# Patient Record
Sex: FEMALE | Race: WHITE | NOT HISPANIC OR LATINO | Employment: STUDENT | URBAN - METROPOLITAN AREA
[De-identification: names, ages, dates, MRNs, and addresses within clinical notes are randomized per-mention and may not be internally consistent; named-entity substitution may affect disease eponyms.]

---
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
## 2020-01-16 ENCOUNTER — LAB SERVICES (OUTPATIENT)
Dept: LAB | Age: 20
End: 2020-01-16

## 2020-01-16 DIAGNOSIS — Z79.899 ENCOUNTER FOR LONG-TERM (CURRENT) USE OF MEDICATIONS: ICD-10-CM

## 2020-01-16 LAB
CHOLEST SERPL-MCNC: 214 MG/DL
CHOLEST/HDLC SERPL: 3.6 {RATIO}
FASTING STATUS PATIENT QL REPORTED: 10 HRS
GLUCOSE SERPL-MCNC: 90 MG/DL (ref 65–99)
HDLC SERPL-MCNC: 60 MG/DL
LDLC SERPL-MCNC: 137 MG/DL
LENGTH OF FAST TIME PATIENT: 10 HRS
NONHDLC SERPL-MCNC: 154 MG/DL
TRIGL SERPL-MCNC: 87 MG/DL

## 2020-01-16 PROCEDURE — 82947 ASSAY GLUCOSE BLOOD QUANT: CPT | Performed by: INTERNAL MEDICINE

## 2020-01-16 PROCEDURE — 36415 COLL VENOUS BLD VENIPUNCTURE: CPT | Performed by: INTERNAL MEDICINE

## 2020-01-16 PROCEDURE — 80061 LIPID PANEL: CPT | Performed by: INTERNAL MEDICINE

## 2020-01-17 ENCOUNTER — TELEPHONE (OUTPATIENT)
Dept: BEHAVIORAL HEALTH | Age: 20
End: 2020-01-17

## 2020-01-27 RX ORDER — ARIPIPRAZOLE 10 MG/1
10 TABLET ORAL DAILY
Qty: 30 TABLET | Refills: 0 | Status: SHIPPED | OUTPATIENT
Start: 2020-01-27

## 2020-03-05 ENCOUNTER — TELEPHONE (OUTPATIENT)
Dept: BEHAVIORAL HEALTH | Age: 20
End: 2020-03-05

## 2020-03-10 ENCOUNTER — APPOINTMENT (OUTPATIENT)
Dept: BEHAVIORAL HEALTH | Age: 20
End: 2020-03-10

## 2020-05-28 RX ORDER — GABAPENTIN 300 MG/1
CAPSULE ORAL
Qty: 90 CAPSULE | OUTPATIENT
Start: 2020-05-28

## 2020-05-28 RX ORDER — LAMOTRIGINE 150 MG/1
TABLET ORAL
Qty: 180 TABLET | Refills: 1 | OUTPATIENT
Start: 2020-05-28

## 2020-05-28 RX ORDER — BUPROPION HYDROCHLORIDE 300 MG/1
300 TABLET ORAL DAILY
Qty: 90 TABLET | Refills: 1 | OUTPATIENT
Start: 2020-05-28

## 2021-09-28 ENCOUNTER — HOSPITAL ENCOUNTER (OUTPATIENT)
Dept: RESEARCH | Facility: CLINIC | Age: 21
End: 2021-09-28
Attending: INTERNAL MEDICINE

## 2021-09-28 PROCEDURE — 510N000009 HC RESEARCH FACILITY, PER 15 MIN

## 2021-09-28 PROCEDURE — 300N000003 HC RESEARCH SPECIMEN PROCESSING, SIMPLE

## 2021-09-28 PROCEDURE — 510N000017 HC CRU PATIENT CARE, PER 15 MIN

## 2021-09-28 NOTE — ADDENDUM NOTE
Encounter addended by: Zulema Gaming on: 9/28/2021 1:54 PM   Actions taken: Charge Capture section accepted

## 2022-04-26 ENCOUNTER — HOSPITAL ENCOUNTER (OUTPATIENT)
Dept: RESEARCH | Facility: CLINIC | Age: 22
Discharge: HOME | End: 2022-04-26
Attending: INTERNAL MEDICINE

## 2022-04-26 PROCEDURE — 300N000003 HC RESEARCH SPECIMEN PROCESSING, SIMPLE

## 2022-04-26 PROCEDURE — 510N000017 HC CRU PATIENT CARE, PER 15 MIN

## 2022-04-26 PROCEDURE — 510N000009 HC RESEARCH FACILITY, PER 15 MIN

## 2022-04-26 NOTE — ADDENDUM NOTE
Encounter addended by: Zulema Gaming on: 4/26/2022 3:28 PM   Actions taken: Charge Capture section accepted

## 2023-04-26 ENCOUNTER — TELEPHONE (OUTPATIENT)
Dept: PLASTIC SURGERY | Facility: CLINIC | Age: 23
End: 2023-04-26

## 2023-04-26 NOTE — CONFIDENTIAL NOTE
Writer called re: pt request to schedule a top surgery consult with Dr. Henry. Writer gave next available dates in November. Pt stated they need to check with their school schedule and will get back. Writer gave direct phone number to call back.

## 2023-04-26 NOTE — TELEPHONE ENCOUNTER
[facility] Call Center    Phone Message    May a detailed message be left on voicemail: yes     Reason for Call: Other: Anny is calling in looking to schedule an appt. Pt states that they would like to schedule with Dr. Henry to discuss chest masculinization surgery. Please call back to discuss.     Action Taken: Message routed to:  [facility] Gender Care    Travel Screening: Not Applicable

## 2023-05-18 ENCOUNTER — TELEPHONE (OUTPATIENT)
Dept: PLASTIC SURGERY | Facility: CLINIC | Age: 23
End: 2023-05-18
Payer: COMMERCIAL

## 2023-05-18 DIAGNOSIS — F64.0 GENDER DYSPHORIA IN ADULT: Primary | ICD-10-CM

## 2023-05-18 NOTE — CONFIDENTIAL NOTE
[facility] :  Care Coordination Note     SITUATION   Anny Dodd (they/them) is a 22 year old adult who is receiving support for:  Care Team  .    BACKGROUND     Pt is scheduled for a top surgery consult with Dr. Henry on 1/9/23.     ASSESSMENT     Surgery              CGC Assessment  [facility] ([facility]) Enrollment: Enrolled  Patient has a therapist: Yes  Name of therapist: Brittany Hart  Letter of support #1: Requested  Surgery being considered: Yes  Mastectomy: Yes    Pt reports:   No nicotine or other gender affirming surgeries      PLAN          Nursing Interventions:       Follow-up plan:  1. Obtain LOS nearer to consult.        Latricia Livingston

## 2023-06-04 ENCOUNTER — HEALTH MAINTENANCE LETTER (OUTPATIENT)
Age: 23
End: 2023-06-04

## 2023-08-24 NOTE — TELEPHONE ENCOUNTER
FUTURE VISIT INFORMATION      FUTURE VISIT INFORMATION:  Date: 10/3/23  Time: 11:00am  [place] [facility]  REFERRAL INFORMATION:  Reason for visit/diagnosis  top consult    RECORDS REQUESTED FROM:       No recs to collect

## 2023-09-05 ENCOUNTER — TELEPHONE (OUTPATIENT)
Dept: PLASTIC SURGERY | Facility: CLINIC | Age: 23
End: 2023-09-05
Payer: COMMERCIAL

## 2023-09-29 ENCOUNTER — TRANSFERRED RECORDS (OUTPATIENT)
Dept: HEALTH INFORMATION MANAGEMENT | Facility: CLINIC | Age: 23
End: 2023-09-29

## 2023-10-03 ENCOUNTER — PRE VISIT (OUTPATIENT)
Dept: PLASTIC SURGERY | Facility: CLINIC | Age: 23
End: 2023-10-03

## 2023-10-03 ENCOUNTER — OFFICE VISIT (OUTPATIENT)
Dept: PLASTIC SURGERY | Facility: CLINIC | Age: 23
End: 2023-10-03
Attending: SURGERY
Payer: COMMERCIAL

## 2023-10-03 VITALS
BODY MASS INDEX: 42.85 KG/M2 | SYSTOLIC BLOOD PRESSURE: 137 MMHG | WEIGHT: 273 LBS | HEIGHT: 67 IN | DIASTOLIC BLOOD PRESSURE: 75 MMHG | OXYGEN SATURATION: 99 % | HEART RATE: 94 BPM

## 2023-10-03 DIAGNOSIS — F64.0 GENDER DYSPHORIA IN ADULT: ICD-10-CM

## 2023-10-03 PROCEDURE — 99203 OFFICE O/P NEW LOW 30 MIN: CPT | Performed by: SURGERY

## 2023-10-03 ASSESSMENT — PAIN SCALES - GENERAL: PAINLEVEL: NO PAIN (0)

## 2023-10-03 NOTE — LETTER
"10/3/2023       RE: Xavier Dodd  [address] [place] [address]     Dear Colleague,    Thank you for referring your patient, Xavier Dodd, to the [facility] PLASTIC AND RECONSTRUCTIVE SURGERY CLINIC [place] [facility]. Please see a copy of my visit note below.    PLASTICS NEW TOP   HPI: This is a 23 year old adult who identifies as nonbinary (he/they) with a history of gender dysphoria and PE who presents today with their partner Gwen (he/they) for a consultation for top surgery. Their pronouns are they/them and their preferred name is Sugey. They were referred by their insurance company, [employer] (who only covered 3 surgeons) and made their appointment 5 months ago (moved up from January after re-scheduling starting in July. They talked with Angelique about what was needed to be cleared for surgery). Their therapist is Ector Selby from [facility] who has written them a letter of support. This will be uploaded to [employer] [facility] after today's visit. Sugey has been seeing Ector for a few months. They are not currently on testosterone because \"the vast majority of my dysphoria is from my chest.\" They will reconsider after surgery. They have been living their chosen gender identity/role for 2 years. They bind with GC2B for size inclusion reasons, they wear this 1-3 days per week. No breast lumps, skin puckering, nipple drainage, or other breast problems. No history of breast imaging, including mammogram or breast ultrasound.     Medical Hx: Gender dysphoria. No history of asthma, diabetes mellitus, or GERD. No bleeding, clotting, healing or scarring problems. History of pulmonary embolism during covid infection. Did anticoagulation therapy for a year following this. Had Covid Pneumonia prior to this. Wasn't intubated but used a Bipap mask. Also history of Migraines. Morbid " "obesity.    Mental Hx: Bipolar II since age 13, GID, ?PTSD. Mostly stable on medications, gets worse in the winter but nothing unmanageable. Questions ADHD or ASD.      Surgical Hx: pneumothorax/collapsed lung with pneumonia in childhood; surgically corrected age 2. No known complications with anesthesia. Still has 3rd molars.     Family Hx: No family history of breast or ovarian cancer. Maternal great grandfather had diabetes. Dad was adopted. Paternal grandfather has alzheimer's. Has 1 brother who is healthy. Mom is healthy.    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 Gwen was diagnosed with Multiple Sclerosis after Covid. Majoring in Psychology, sociology minor but focuses specifically on eating disorders. Final year at [employer].  Relationship status/family: Lives with partner in [place], [place].  Gwen will help take care of them after surgery. Smoking status: No nicotine, occasional weed. Alcohol use: 1 drink every 2 weeks maybe.  Diet: Omnivore. \"I just eat enough to keep myself alive.\" Caffeine: Drinks a monster energy drink every day. Exercise: has a lot of issues exercising due to dysphoria. Does walk/hike. Would like to get back into working out, previously played rugby. Sleep:  Averages about 7 hours a night. No medications.     PE: General: Height: 5' 7\" Weight: 273 lbs 0 oz BMI: 42.76 kg/m   Chest:   Nice upper chest contour.   Grade 3 nipple ptosis.   Slight central discoloration (hyperpigmentation)  Right breast (600g) is slightly larger than the left (500g).   IMFs situated about 2-3 cms below the pec muscle.   Right IMF situated about 1-2 cm lower than the left.  Continued discoloration under IMFs.   Incisions may touch at midline.  moderate lateral thoracic rolls.   present anterior axillary folds.   Striae bilaterally.  No lymphadenopathy or masses.   Very dense, not lumpy.  Photos taken with consent.     A&P: 23 year old " adult who identifies as  Nonbinary (they/them) who is a good candidate for gender affirming top surgery with one of the following: bilateral simple mastectomy vs. breast reduction, +/- possible nipple graft reconstruction. They will most likely need a bilateral simple mastectomy with nipple graft reconstruction depending on intraoperative findings and the patient's desired outcome. The patient is NOT interested in nipple grafts. The patient will NOT need a pre-op mammogram in addition to an H&P from their PCP.      They did not meet with our Transgender Coordinator but they will be in contact via [facility] to discuss communications with staff and timeline. They did receive an introductory folder of information and contact numbers/names. Any further discussion of risks and complications will be reviewed during the pre-op visit.    Patient accepts the risks of this procedure and would like to proceed with surgery. They are able to give informed consent for this medically necessary procedure. Once we receive and review their therapist letter of support we will initiate the prior authorization process.     Encouraged patient to contact our CGC manager, Pablo Mitchell, to write a letter about not having a wait list for cases that need to be done sooner than current availability.    According to [place] Case Law and [facility] standards of care, with an appropriate letter of support from a mental health provider, top surgery/mastectomy is medically necessary for the treatment of gender dysphoria.     Total time = 30 minutes, spent on the date of encounter doing chart review, history and physical, dressing changes, documentation, patient education, and any further activity as noted above.     This note was prepared on behalf of Aparna Henry MD by Lauren Palencia (tameka/renee), a trained medical scribe, based on my observations and the provider's statements to me.              Again, thank you for allowing me to participate  in the care of your patient.      Sincerely,    Aparna Henry MD

## 2023-10-03 NOTE — NURSING NOTE
"Chief Complaint   Patient presents with    Consult     Sugey, is being seen today for a consult regarding top surgery.       Vitals:    10/03/23 1128   BP: 137/75   BP Location: Left arm   Patient Position: Chair   Cuff Size: Adult Large   Pulse: 94   SpO2: 99%   Weight: 123.8 kg (273 lb)   Height: 1.702 m (5' 7\")       Body mass index is 42.76 kg/m .      Oly Ceballos LPN    "

## 2023-10-03 NOTE — PROGRESS NOTES
"PLASTICS NEW TOP   HPI: This is a 23 year old adult who identifies as nonbinary (he/they) with a history of gender dysphoria and PE who presents today with their partner Gwen (he/they) for a consultation for top surgery. Their pronouns are they/them and their preferred name is Sugey. They were referred by their insurance company, [employer] (who only covered 3 surgeons) and made their appointment 5 months ago (moved up from January after re-scheduling starting in July. They talked with Angelique about what was needed to be cleared for surgery). Their therapist is Ector Selby from [facility] who has written them a letter of support. This will be uploaded to [employer] after today's visit. Sugey has been seeing Ector for a few months. They are not currently on testosterone because \"the vast majority of my dysphoria is from my chest.\" They will reconsider after surgery. They have been living their chosen gender identity/role for 2 years. They bind with GC2B for size inclusion reasons, they wear this 1-3 days per week. No breast lumps, skin puckering, nipple drainage, or other breast problems. No history of breast imaging, including mammogram or breast ultrasound.     Medical Hx: Gender dysphoria. No history of asthma, diabetes mellitus, or GERD. No bleeding, clotting, healing or scarring problems. History of pulmonary embolism during covid infection. Did anticoagulation therapy for a year following this. Had Covid Pneumonia prior to this. Wasn't intubated but used a Bipap mask. Also history of Migraines. Morbid obesity.    Mental Hx: Bipolar II since age 13, GID, ?PTSD. Mostly stable on medications, gets worse in the winter but nothing unmanageable. Questions ADHD or ASD.      Surgical Hx: pneumothorax/collapsed lung with pneumonia in childhood; surgically corrected age 2. No known complications with anesthesia. Still has 3rd molars.     Family Hx: No family history of " "breast or ovarian cancer. Maternal great grandfather had diabetes. Dad was adopted. Paternal grandfather has alzheimer's. Has 1 brother who is healthy. Mom is healthy.    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 Gwen was diagnosed with Multiple Sclerosis after Covid. Majoring in Psychology, sociology minor but focuses specifically on eating disorders. Final year at [employer].  Relationship status/family: Lives with partner in [place], [place].  Gwen will help take care of them after surgery. Smoking status: No nicotine, occasional weed. Alcohol use: 1 drink every 2 weeks maybe.  Diet: Omnivore. \"I just eat enough to keep myself alive.\" Caffeine: Drinks a monster energy drink every day. Exercise: has a lot of issues exercising due to dysphoria. Does walk/hike. Would like to get back into working out, previously played rugby. Sleep:  Averages about 7 hours a night. No medications.     PE: General: Height: 5' 7\" Weight: 273 lbs 0 oz BMI: 42.76 kg/m   Chest:   Nice upper chest contour.   Grade 3 nipple ptosis.   Slight central discoloration (hyperpigmentation)  Right breast (600g) is slightly larger than the left (500g).   IMFs situated about 2-3 cms below the pec muscle.   Right IMF situated about 1-2 cm lower than the left.  Continued discoloration under IMFs.   Incisions may touch at midline.  moderate lateral thoracic rolls.   present anterior axillary folds.   Striae bilaterally.  No lymphadenopathy or masses.   Very dense, not lumpy.  Photos taken with consent.     A&P: 23 year old adult who identifies as  Nonbinary (they/them) who is a good candidate for gender affirming top surgery with one of the following: bilateral simple mastectomy vs. breast reduction, +/- possible nipple graft reconstruction. They will most likely need a bilateral simple mastectomy with nipple graft reconstruction depending on intraoperative findings and the " patient's desired outcome. The patient is NOT interested in nipple grafts. The patient will NOT need a pre-op mammogram in addition to an H&P from their PCP.      They did not meet with our Transgender Coordinator but they will be in contact via [employer] to discuss communications with staff and timeline. They did receive an introductory folder of information and contact numbers/names. Any further discussion of risks and complications will be reviewed during the pre-op visit.    Patient accepts the risks of this procedure and would like to proceed with surgery. They are able to give informed consent for this medically necessary procedure. Once we receive and review their therapist letter of support we will initiate the prior authorization process.     Encouraged patient to contact our [facility] manager, Pablo Mitchell, to write a letter about not having a wait list for cases that need to be done sooner than current availability.    According to [place] Case Law and [facility] standards of care, with an appropriate letter of support from a mental health provider, top surgery/mastectomy is medically necessary for the treatment of gender dysphoria.     Total time = 30 minutes, spent on the date of encounter doing chart review, history and physical, dressing changes, documentation, patient education, and any further activity as noted above.     This note was prepared on behalf of Aparna Henry MD by Lauren Palencia (they/them), a trained medical scribe, based on my observations and the provider's statements to me.

## 2023-10-12 ENCOUNTER — DOCUMENTATION ONLY (OUTPATIENT)
Dept: PLASTIC SURGERY | Facility: CLINIC | Age: 23
End: 2023-10-12
Payer: COMMERCIAL

## 2023-10-12 NOTE — PROGRESS NOTES
[facility] :  Care Coordination Note     SITUATION   Sugey Dodd (they/them) is a 23 year old adult who is receiving support for:  Care Team  .    BACKGROUND     LOS received. LOS will need updates. Sent email to pt provider and pt will needed updates.    10/31/23  Updated Los meets criteria. Faxed to HIM. Updated Pt list. Sent message to RNCC.    ASSESSMENT     Surgery              CGC Assessment  [facility] Enrollment: Enrolled  Patient has a therapist: Yes  Name of therapist: Ector Selby  Letter of support #1: Received  Letter #1 Date: 09/29/23  Surgery being considered: Yes  Mastectomy: Yes      PLAN          Nursing Interventions:       Follow-up plan:  RNCC and surgeon will place RIP Dillard

## 2023-11-15 ENCOUNTER — DOCUMENTATION ONLY (OUTPATIENT)
Dept: PLASTIC SURGERY | Facility: CLINIC | Age: 23
End: 2023-11-15
Payer: COMMERCIAL

## 2023-11-15 ENCOUNTER — TELEPHONE (OUTPATIENT)
Dept: PLASTIC SURGERY | Facility: CLINIC | Age: 23
End: 2023-11-15
Payer: COMMERCIAL

## 2023-11-15 NOTE — TELEPHONE ENCOUNTER
RN Care Coordinator: Sandie Wray    Surgery is scheduled with Dr. Henry  Date: 3/27/2024   Location: [facility]      H&P to be completed by a clinic in [place], [place] - Patient does not have a PCP. Patient was not sure exactly what clinic they will be going to.      Surgical consult: 3/5/2024 at 8:30am with Gayle Duran [facility]    Post-op visit(s):   4/5/2024 at 1:30pm with EMILY Whitaker CNP [facility]  4/30/2024 at 10:00am with Dr. Henry [facility]      Patient will receive a phone call from pre-admission nurses 1-2 days prior to surgery with arrival time and NPO instructions. Approximate arrival time/surgery time given to patient: 1:00pm surgery and 11:00am arrival time. Patient aware times are subject to change up until day before surgery.         Spoke with the patient and was able to confirm all scheduled information.       Patient questions/concerns: N/A       Surgery packet to be sent via [employer]    __    Torie Akhtar, Senior Perioperative Coordinator, on 11/15/2023 at 9:06 AM  P: [phone]

## 2023-11-15 NOTE — PROGRESS NOTES
[facility] :  Care Coordination Note     SITUATION   Sugey is a 23 year old adult who is receiving support for: gender dysphoria.    BACKGROUND     Pt has seen Dr Henry for gender affirming top surgery consultation.  Pt has surgery scheduled for 3/27/24.    ASSESSMENT     Pt LOS in chart dated 9/23/23 and reviewed by our .  Pt does not need a pre-op mammogram. Pt does not use nicotine.      PLAN     Follow-up plan:    Will contact pt to inform of need for pre-op H&P within 30 days of surgery.  Pt to have pre-op surgical consultation with Dr Henry 3/12/24.     JEFFY Hooper

## 2024-03-06 ENCOUNTER — VIRTUAL VISIT (OUTPATIENT)
Dept: PLASTIC SURGERY | Facility: CLINIC | Age: 24
End: 2024-03-06
Payer: COMMERCIAL

## 2024-03-06 DIAGNOSIS — F64.0 GENDER DYSPHORIA IN ADULT: Primary | ICD-10-CM

## 2024-03-06 PROCEDURE — 99207 PR NO CHARGE NURSE ONLY: CPT | Mod: 95

## 2024-03-06 NOTE — PROGRESS NOTES
Pre and Post Op Patient Education                                       Diagnosis: gender dysphoria  Teaching pre and post op for Nitin Park  Person involved in teaching: Nitin      Motivation level: High  Asks questions: Yes  Eager to learn:  Yes  Cooperative: Yes  Receptive (willing/able to accept information): Yes    Patient demonstrates an understanding of the following:  - Date of surgery:  3/27/24  - Location of surgery: [address] [place] [address]   - Pre operative History/Physical other testing prior to surgery: 3/12/24  No more than 30 days prior to surgery date.   Medications to take the day of surgery: Per PCP   Blood thinner medications discussed and when to stop (if applicable): Per PCP   Diabetes medication management (if applicable): Per PCP    - Post-op follow-up: 4/5/24 (RN talked about moving this to 4/2)      Patient verbalizes an understanding of the following:  - The need for a responsible adult  and someone to stay with them for the first 24 hours post-operatively: Yes  - NPO per anesthesia guidelines: Yes  - Holding multivitamins, supplements, aspirin, and ibuprofen 7-days prior to surgery: Yes  - Pre-op showering x2 with Hibiclens/chlorhexadine soap: Yes    Discussed   - Pain management after surgery  - Signs and symptoms of infection  - Drain care will be taught at the time of discharge  - Reviewed Bilateral Mastectomy: Pre and Post Operative Instructions packet in full Yes  - Information about how to contact the hospital, nurse, and clinic if needed    Postoperative Plans:  Return to grad school in 1-2 weeks, partners to help care for     Surgical instructions given to patient via [email or web address].    Total time with patient: 40 minutes    Sandie Wray RN

## 2024-03-06 NOTE — LETTER
3/6/2024       RE: Xavier Dodd  [address]  [place] [address]     Dear Colleague,    Thank you for referring your patient, Xavier Dodd, to the [facility] PLASTIC AND RECONSTRUCTIVE SURGERY CLINIC [place] at [facility]. Please see a copy of my visit note below.    Pre and Post Op Patient Education                                       Diagnosis: gender dysphoria  Teaching pre and post op for Nitin Park  Person involved in teaching: Nitin      Motivation level: High  Asks questions: Yes  Eager to learn:  Yes  Cooperative: Yes  Receptive (willing/able to accept information): Yes    Patient demonstrates an understanding of the following:  - Date of surgery:  3/27/24  - Location of surgery: [address] [place] [address]   - Pre operative History/Physical other testing prior to surgery: 3/12/24  No more than 30 days prior to surgery date.   Medications to take the day of surgery: Per PCP   Blood thinner medications discussed and when to stop (if applicable): Per PCP   Diabetes medication management (if applicable): Per PCP    - Post-op follow-up: 4/5/24 (RN talked about moving this to 4/2)      Patient verbalizes an understanding of the following:  - The need for a responsible adult  and someone to stay with them for the first 24 hours post-operatively: Yes  - NPO per anesthesia guidelines: Yes  - Holding multivitamins, supplements, aspirin, and ibuprofen 7-days prior to surgery: Yes  - Pre-op showering x2 with Hibiclens/chlorhexadine soap: Yes    Discussed   - Pain management after surgery  - Signs and symptoms of infection  - Drain care will be taught at the time of discharge  - Reviewed Bilateral Mastectomy: Pre and Post Operative Instructions packet in full Yes  - Information about how to contact the hospital, nurse, and clinic if needed    Postoperative Plans:  Return to grad school in 1-2 weeks, partners to help care for      Surgical instructions given to patient via Mychart.    Total time with patient: 40 minutes          Again, thank you for allowing me to participate in the care of your patient.      Sincerely,    Sandie Wray RN

## 2024-03-12 ENCOUNTER — OFFICE VISIT (OUTPATIENT)
Dept: PLASTIC SURGERY | Facility: CLINIC | Age: 24
End: 2024-03-12
Payer: COMMERCIAL

## 2024-03-12 VITALS
OXYGEN SATURATION: 96 % | HEART RATE: 92 BPM | DIASTOLIC BLOOD PRESSURE: 79 MMHG | BODY MASS INDEX: 43.47 KG/M2 | WEIGHT: 277 LBS | HEIGHT: 67 IN | SYSTOLIC BLOOD PRESSURE: 135 MMHG

## 2024-03-12 DIAGNOSIS — F64.0 GENDER DYSPHORIA IN ADULT: Primary | ICD-10-CM

## 2024-03-12 PROCEDURE — 99213 OFFICE O/P EST LOW 20 MIN: CPT | Performed by: SURGERY

## 2024-03-12 ASSESSMENT — PAIN SCALES - GENERAL: PAINLEVEL: NO PAIN (0)

## 2024-03-12 NOTE — NURSING NOTE
"Chief Complaint   Patient presents with    SCOTT Taveras, is beng seen today for a pre-op DOS 3/27/2024.       Vitals:    03/12/24 1037   BP: 135/79   BP Location: Left arm   Patient Position: Chair   Cuff Size: Adult Large   Pulse: 92   SpO2: 96%   Weight: 125.6 kg (277 lb)   Height: 1.702 m (5' 7\")       Body mass index is 43.38 kg/m .      Oly Ceballos LPN    "

## 2024-03-12 NOTE — PROGRESS NOTES
PLASTICS PRE-OP  This is a 23 year old trans adult (they/them) with a history of pulmonary embolism during COVID who presents for a scheduled pre-op visit prior to bilateral simple mastectomy without nipple graft reconstruction scheduled for 3/27/2024. They are here today by themself. Drove 1.5 hrs from [place], [place]. Their letter of support from Ector Selby LMFT-IT has been received. History and physical are scheduled for later today in [facility].     Pulmonary embolism happened after patient got covid. Following this they were on blood thinners for a year. Primary source never determined. They have not had any recurrence of pulmonary emboli. When they go their physical later today we will double check that they should not go on prophylactic blood thinners prior to surgery.     They will be staying at their partners' house (they have 2 partners, and will be staying with the one they do not co-habitate with). This is predominantly because their partner has a recliner and patient likes to sleep on their stomach. We discussed that this is not necessary and a body pillow may serve the same function if sleeping on their stomach is what they are worried about.    Our Gender Care Team member Sandie (RN), discussed periop instructions with the patient via telehealth prior to today's visit, including: not eating anything 8 hours prior to surgery, drinking clear liquids up to 2 hours before surgery except for morning medications with a sip of water, the preop shower with surgical soap which was given, and wearing a button- or zip-up shirt on the day of surgery. The patient was also instructed to avoid NSAIDs x 1 week both before AND after surgery, but he may take Tylenol post-op for pain as needed. The patient was provided with a folder of information on mady-op topics, including where the surgery will be.     I discussed the following with the patient; preop, intraop and postop phases of care on  the day of surgery, the placement of a bladder catheter during surgery that will likely be removed in recovery, postop cares and limitations with relation to home and work settings, 5-lb weight restriction for the first 3 weeks postop, and how long to maintain limited activities. We also discussed Zofran, oxycodone, Z-patrick, and antipruritics which will be prescribed. We discussed that preventing constipation will be their responsibility, and we discussed methods such as aloe, prune juice or Miralax.     In addition, we went over the possible risks and complications involved with this elective procedure. These include but are not limited to: infection, bleeding, hematoma/seroma formation, and poor healing (including dehiscence, nipple graft loss, or hypertrophic scarring). We also discussed the possibility of altered chest sensation (either hypo or hypersensitive), residual deformities and asymmetries, possible further surgical revisions, and possible injury to surrounding neurovascular and musculoskeletal structures, including intra-axillary or intra-thoracic. We lastly discussed anesthetic risks including DVT/PE or cardiopulmonary events.      All questions were answered. Taveras will bring any other questions that arise to the day of surgery.    Total time = 20 minutes, spent on the date of encounter doing chart review, history and physical, dressing changes, documentation, patient education, and any further activity as noted above.     This note was prepared on behalf of Aparna Henry MD by Lauren Palencia (they/them) EMT, a trained medical scribe, based on my observations and the provider's statements to me.

## 2024-03-12 NOTE — LETTER
3/12/2024       RE: Xavier Dodd  [address]  [place] [address]     Dear Colleague,    Thank you for referring your patient, Xavier Dodd, to the [facility] PLASTIC AND RECONSTRUCTIVE SURGERY CLINIC [place] at [facility]. Please see a copy of my visit note below.    PLASTICS PRE-OP  This is a 23 year old trans adult (they/them) with a history of pulmonary embolism during COVID who presents for a scheduled pre-op visit prior to bilateral simple mastectomy without nipple graft reconstruction scheduled for 3/27/2024. They are here today by themself. Drove 1.5 hrs from [place]. Their letter of support from Ector Selby LMFT- has been received. History and physical are scheduled for later today in [facility].     Pulmonary embolism happened after patient got covid. Following this they were on blood thinners for a year. Primary source never determined. They have not had any recurrence of pulmonary emboli. When they go their physical later today we will double check that they should not go on prophylactic blood thinners prior to surgery.     They will be staying at their partners' house (they have 2 partners, and will be staying with the one they do not co-habitate with). This is predominantly because their partner has a recliner and patient likes to sleep on their stomach. We discussed that this is not necessary and a body pillow may serve the same function if sleeping on their stomach is what they are worried about.    Our Gender Care Team member Sandie (RN), discussed periop instructions with the patient via telehealth prior to today's visit, including: not eating anything 8 hours prior to surgery, drinking clear liquids up to 2 hours before surgery except for morning medications with a sip of water, the preop shower with surgical soap which was given, and wearing a button- or zip-up shirt on the day of  surgery. The patient was also instructed to avoid NSAIDs x 1 week both before AND after surgery, but he may take Tylenol post-op for pain as needed. The patient was provided with a folder of information on mady-op topics, including where the surgery will be.     I discussed the following with the patient; preop, intraop and postop phases of care on the day of surgery, the placement of a bladder catheter during surgery that will likely be removed in recovery, postop cares and limitations with relation to home and work settings, 5-lb weight restriction for the first 3 weeks postop, and how long to maintain limited activities. We also discussed Zofran, oxycodone, Z-patrick, and antipruritics which will be prescribed. We discussed that preventing constipation will be their responsibility, and we discussed methods such as aloe, prune juice or Miralax.     In addition, we went over the possible risks and complications involved with this elective procedure. These include but are not limited to: infection, bleeding, hematoma/seroma formation, and poor healing (including dehiscence, nipple graft loss, or hypertrophic scarring). We also discussed the possibility of altered chest sensation (either hypo or hypersensitive), residual deformities and asymmetries, possible further surgical revisions, and possible injury to surrounding neurovascular and musculoskeletal structures, including intra-axillary or intra-thoracic. We lastly discussed anesthetic risks including DVT/PE or cardiopulmonary events.      All questions were answered. Taveras will bring any other questions that arise to the day of surgery.    Total time = 20 minutes, spent on the date of encounter doing chart review, history and physical, dressing changes, documentation, patient education, and any further activity as noted above.     This note was prepared on behalf of Aparna Henry MD by Lauren Palencia (they/them) EMT, a trained medical scribe, based on my  observations and the provider's statements to me.         Again, thank you for allowing me to participate in the care of your patient.      Sincerely,    Aparna Henry MD

## 2024-03-25 RX ORDER — FEXOFENADINE HCL 60 MG/1
60 TABLET, FILM COATED ORAL DAILY
COMMUNITY

## 2024-03-25 RX ORDER — FERROUS SULFATE 324(65)MG
324 TABLET, DELAYED RELEASE (ENTERIC COATED) ORAL DAILY
COMMUNITY

## 2024-03-25 RX ORDER — POLYETHYLENE GLYCOL 3350 17 G/17G
17 POWDER, FOR SOLUTION ORAL DAILY PRN
COMMUNITY

## 2024-03-25 RX ORDER — MULTIVITAMIN,THER AND MINERALS
1 TABLET ORAL DAILY
COMMUNITY

## 2024-03-25 RX ORDER — PROPRANOLOL HYDROCHLORIDE 10 MG/1
10 TABLET ORAL PRN
COMMUNITY
Start: 2021-09-02

## 2024-03-25 RX ORDER — ERGOCALCIFEROL 1.25 MG/1
50000 CAPSULE ORAL DAILY
COMMUNITY

## 2024-03-27 ENCOUNTER — ANESTHESIA EVENT (OUTPATIENT)
Dept: SURGERY | Facility: CLINIC | Age: 24
End: 2024-03-27
Payer: COMMERCIAL

## 2024-03-27 ENCOUNTER — ANESTHESIA (OUTPATIENT)
Dept: SURGERY | Facility: CLINIC | Age: 24
End: 2024-03-27
Payer: COMMERCIAL

## 2024-03-27 ENCOUNTER — HOSPITAL ENCOUNTER (OUTPATIENT)
Facility: CLINIC | Age: 24
Discharge: HOME | End: 2024-03-27
Attending: SURGERY | Admitting: SURGERY
Payer: COMMERCIAL

## 2024-03-27 VITALS
RESPIRATION RATE: 13 BRPM | WEIGHT: 271.61 LBS | SYSTOLIC BLOOD PRESSURE: 117 MMHG | HEART RATE: 108 BPM | BODY MASS INDEX: 42.63 KG/M2 | TEMPERATURE: 99.1 F | OXYGEN SATURATION: 96 % | DIASTOLIC BLOOD PRESSURE: 66 MMHG | HEIGHT: 67 IN

## 2024-03-27 DIAGNOSIS — F64.9 GENDER DYSPHORIA: Primary | ICD-10-CM

## 2024-03-27 LAB
ANION GAP SERPL CALCULATED.3IONS-SCNC: 11 MMOL/L (ref 7–15)
BUN SERPL-MCNC: 14.8 MG/DL (ref 6–20)
CALCIUM SERPL-MCNC: 9.4 MG/DL (ref 8.6–10)
CHLORIDE SERPL-SCNC: 103 MMOL/L (ref 98–107)
CREAT SERPL-MCNC: 0.86 MG/DL (ref 0.51–1.17)
DEPRECATED HCO3 PLAS-SCNC: 22 MMOL/L (ref 22–29)
EGFRCR SERPLBLD CKD-EPI 2021: >90 ML/MIN/1.73M2
ERYTHROCYTE [DISTWIDTH] IN BLOOD BY AUTOMATED COUNT: 12.9 % (ref 10–15)
GLUCOSE SERPL-MCNC: 91 MG/DL (ref 70–99)
HCT VFR BLD AUTO: 42.3 % (ref 35–53)
HGB BLD-MCNC: 14.3 G/DL (ref 13.3–17.7)
MCH RBC QN AUTO: 28.9 PG (ref 26.5–33)
MCHC RBC AUTO-ENTMCNC: 33.8 G/DL (ref 31.5–36.5)
MCV RBC AUTO: 86 FL (ref 78–100)
PLATELET # BLD AUTO: 344 10E3/UL (ref 150–450)
POTASSIUM SERPL-SCNC: 4.2 MMOL/L (ref 3.4–5.3)
RBC # BLD AUTO: 4.94 10E6/UL (ref 3.8–5.9)
SODIUM SERPL-SCNC: 136 MMOL/L (ref 135–145)
WBC # BLD AUTO: 7.6 10E3/UL (ref 4–11)

## 2024-03-27 PROCEDURE — 80048 BASIC METABOLIC PNL TOTAL CA: CPT | Performed by: ANESTHESIOLOGY

## 2024-03-27 PROCEDURE — 360N000076 HC SURGERY LEVEL 3, PER MIN: Performed by: SURGERY

## 2024-03-27 PROCEDURE — 250N000011 HC RX IP 250 OP 636: Performed by: ANESTHESIOLOGY

## 2024-03-27 PROCEDURE — 19318 BREAST REDUCTION: CPT | Performed by: ANESTHESIOLOGY

## 2024-03-27 PROCEDURE — 88305 TISSUE EXAM BY PATHOLOGIST: CPT | Mod: 26 | Performed by: PATHOLOGY

## 2024-03-27 PROCEDURE — 710N000010 HC RECOVERY PHASE 1, LEVEL 2, PER MIN: Performed by: SURGERY

## 2024-03-27 PROCEDURE — 258N000003 HC RX IP 258 OP 636: Performed by: NURSE ANESTHETIST, CERTIFIED REGISTERED

## 2024-03-27 PROCEDURE — 250N000011 HC RX IP 250 OP 636: Performed by: NURSE ANESTHETIST, CERTIFIED REGISTERED

## 2024-03-27 PROCEDURE — 710N000012 HC RECOVERY PHASE 2, PER MINUTE: Performed by: SURGERY

## 2024-03-27 PROCEDURE — 250N000025 HC SEVOFLURANE, PER MIN: Performed by: SURGERY

## 2024-03-27 PROCEDURE — 250N000011 HC RX IP 250 OP 636: Performed by: SURGERY

## 2024-03-27 PROCEDURE — 19318 BREAST REDUCTION: CPT | Performed by: NURSE ANESTHETIST, CERTIFIED REGISTERED

## 2024-03-27 PROCEDURE — 19318 BREAST REDUCTION: CPT | Mod: 50 | Performed by: SURGERY

## 2024-03-27 PROCEDURE — 250N000013 HC RX MED GY IP 250 OP 250 PS 637: Performed by: ANESTHESIOLOGY

## 2024-03-27 PROCEDURE — 85041 AUTOMATED RBC COUNT: CPT | Performed by: ANESTHESIOLOGY

## 2024-03-27 PROCEDURE — 999N000141 HC STATISTIC PRE-PROCEDURE NURSING ASSESSMENT: Performed by: SURGERY

## 2024-03-27 PROCEDURE — 370N000017 HC ANESTHESIA TECHNICAL FEE, PER MIN: Performed by: SURGERY

## 2024-03-27 PROCEDURE — 271N000002 HC RX 271: Performed by: SURGERY

## 2024-03-27 PROCEDURE — 36415 COLL VENOUS BLD VENIPUNCTURE: CPT | Performed by: ANESTHESIOLOGY

## 2024-03-27 PROCEDURE — 88305 TISSUE EXAM BY PATHOLOGIST: CPT | Mod: TC | Performed by: SURGERY

## 2024-03-27 PROCEDURE — 250N000009 HC RX 250: Performed by: NURSE ANESTHETIST, CERTIFIED REGISTERED

## 2024-03-27 PROCEDURE — 250N000009 HC RX 250: Performed by: ANESTHESIOLOGY

## 2024-03-27 PROCEDURE — 272N000001 HC OR GENERAL SUPPLY STERILE: Performed by: SURGERY

## 2024-03-27 PROCEDURE — 250N000009 HC RX 250: Performed by: SURGERY

## 2024-03-27 RX ORDER — ONDANSETRON 4 MG/1
4 TABLET, ORALLY DISINTEGRATING ORAL EVERY 30 MIN PRN
Status: DISCONTINUED | OUTPATIENT
Start: 2024-03-27 | End: 2024-03-27 | Stop reason: HOSPADM

## 2024-03-27 RX ORDER — OXYCODONE HYDROCHLORIDE 5 MG/1
5 TABLET ORAL
Status: COMPLETED | OUTPATIENT
Start: 2024-03-27 | End: 2024-03-27

## 2024-03-27 RX ORDER — CEFAZOLIN SODIUM/WATER 3 G/30 ML
3 SYRINGE (ML) INTRAVENOUS
Status: COMPLETED | OUTPATIENT
Start: 2024-03-27 | End: 2024-03-27

## 2024-03-27 RX ORDER — LABETALOL HYDROCHLORIDE 5 MG/ML
10 INJECTION, SOLUTION INTRAVENOUS
Status: DISCONTINUED | OUTPATIENT
Start: 2024-03-27 | End: 2024-03-27 | Stop reason: HOSPADM

## 2024-03-27 RX ORDER — KETAMINE HYDROCHLORIDE 10 MG/ML
INJECTION INTRAMUSCULAR; INTRAVENOUS PRN
Status: DISCONTINUED | OUTPATIENT
Start: 2024-03-27 | End: 2024-03-27

## 2024-03-27 RX ORDER — OXYCODONE HYDROCHLORIDE 10 MG/1
10 TABLET ORAL
Status: DISCONTINUED | OUTPATIENT
Start: 2024-03-27 | End: 2024-03-27 | Stop reason: HOSPADM

## 2024-03-27 RX ORDER — DIAZEPAM 10 MG/2ML
2.5 INJECTION, SOLUTION INTRAMUSCULAR; INTRAVENOUS
Status: DISCONTINUED | OUTPATIENT
Start: 2024-03-27 | End: 2024-03-27 | Stop reason: HOSPADM

## 2024-03-27 RX ORDER — ACETAMINOPHEN 325 MG/1
975 TABLET ORAL ONCE
Status: COMPLETED | OUTPATIENT
Start: 2024-03-27 | End: 2024-03-27

## 2024-03-27 RX ORDER — PROPOFOL 10 MG/ML
INJECTION, EMULSION INTRAVENOUS PRN
Status: DISCONTINUED | OUTPATIENT
Start: 2024-03-27 | End: 2024-03-27

## 2024-03-27 RX ORDER — NALOXONE HYDROCHLORIDE 0.4 MG/ML
0.1 INJECTION, SOLUTION INTRAMUSCULAR; INTRAVENOUS; SUBCUTANEOUS
Status: DISCONTINUED | OUTPATIENT
Start: 2024-03-27 | End: 2024-03-27 | Stop reason: HOSPADM

## 2024-03-27 RX ORDER — HYDROMORPHONE HYDROCHLORIDE 1 MG/ML
0.4 INJECTION, SOLUTION INTRAMUSCULAR; INTRAVENOUS; SUBCUTANEOUS EVERY 5 MIN PRN
Status: DISCONTINUED | OUTPATIENT
Start: 2024-03-27 | End: 2024-03-27 | Stop reason: HOSPADM

## 2024-03-27 RX ORDER — DEXAMETHASONE SODIUM PHOSPHATE 4 MG/ML
INJECTION, SOLUTION INTRA-ARTICULAR; INTRALESIONAL; INTRAMUSCULAR; INTRAVENOUS; SOFT TISSUE PRN
Status: DISCONTINUED | OUTPATIENT
Start: 2024-03-27 | End: 2024-03-27

## 2024-03-27 RX ORDER — HYDROMORPHONE HYDROCHLORIDE 1 MG/ML
0.2 INJECTION, SOLUTION INTRAMUSCULAR; INTRAVENOUS; SUBCUTANEOUS EVERY 5 MIN PRN
Status: DISCONTINUED | OUTPATIENT
Start: 2024-03-27 | End: 2024-03-27 | Stop reason: HOSPADM

## 2024-03-27 RX ORDER — ONDANSETRON 2 MG/ML
4 INJECTION INTRAMUSCULAR; INTRAVENOUS EVERY 30 MIN PRN
Status: DISCONTINUED | OUTPATIENT
Start: 2024-03-27 | End: 2024-03-27 | Stop reason: HOSPADM

## 2024-03-27 RX ORDER — AZITHROMYCIN 250 MG/1
TABLET, FILM COATED ORAL
Qty: 6 TABLET | Refills: 0 | Status: SHIPPED | OUTPATIENT
Start: 2024-03-27 | End: 2024-04-01

## 2024-03-27 RX ORDER — FENTANYL CITRATE 50 UG/ML
25 INJECTION, SOLUTION INTRAMUSCULAR; INTRAVENOUS EVERY 5 MIN PRN
Status: DISCONTINUED | OUTPATIENT
Start: 2024-03-27 | End: 2024-03-27 | Stop reason: HOSPADM

## 2024-03-27 RX ORDER — FENTANYL CITRATE 50 UG/ML
50 INJECTION, SOLUTION INTRAMUSCULAR; INTRAVENOUS EVERY 5 MIN PRN
Status: DISCONTINUED | OUTPATIENT
Start: 2024-03-27 | End: 2024-03-27 | Stop reason: HOSPADM

## 2024-03-27 RX ORDER — LIDOCAINE HYDROCHLORIDE 20 MG/ML
INJECTION, SOLUTION INFILTRATION; PERINEURAL PRN
Status: DISCONTINUED | OUTPATIENT
Start: 2024-03-27 | End: 2024-03-27

## 2024-03-27 RX ORDER — PROPOFOL 10 MG/ML
INJECTION, EMULSION INTRAVENOUS CONTINUOUS PRN
Status: DISCONTINUED | OUTPATIENT
Start: 2024-03-27 | End: 2024-03-27

## 2024-03-27 RX ORDER — HYDROXYZINE HYDROCHLORIDE 25 MG/1
25 TABLET, FILM COATED ORAL EVERY 6 HOURS PRN
Qty: 30 TABLET | Refills: 0 | Status: SHIPPED | OUTPATIENT
Start: 2024-03-27

## 2024-03-27 RX ORDER — CEFAZOLIN SODIUM/WATER 3 G/30 ML
3 SYRINGE (ML) INTRAVENOUS SEE ADMIN INSTRUCTIONS
Status: DISCONTINUED | OUTPATIENT
Start: 2024-03-27 | End: 2024-03-27 | Stop reason: HOSPADM

## 2024-03-27 RX ORDER — BUPIVACAINE HYDROCHLORIDE 2.5 MG/ML
INJECTION, SOLUTION EPIDURAL; INFILTRATION; INTRACAUDAL PRN
Status: DISCONTINUED | OUTPATIENT
Start: 2024-03-27 | End: 2024-03-27 | Stop reason: HOSPADM

## 2024-03-27 RX ORDER — SODIUM CHLORIDE, SODIUM LACTATE, POTASSIUM CHLORIDE, CALCIUM CHLORIDE 600; 310; 30; 20 MG/100ML; MG/100ML; MG/100ML; MG/100ML
INJECTION, SOLUTION INTRAVENOUS CONTINUOUS PRN
Status: DISCONTINUED | OUTPATIENT
Start: 2024-03-27 | End: 2024-03-27

## 2024-03-27 RX ORDER — FENTANYL CITRATE 50 UG/ML
INJECTION, SOLUTION INTRAMUSCULAR; INTRAVENOUS PRN
Status: DISCONTINUED | OUTPATIENT
Start: 2024-03-27 | End: 2024-03-27

## 2024-03-27 RX ORDER — SCOLOPAMINE TRANSDERMAL SYSTEM 1 MG/1
1 PATCH, EXTENDED RELEASE TRANSDERMAL ONCE
Status: DISCONTINUED | OUTPATIENT
Start: 2024-03-27 | End: 2024-03-27 | Stop reason: HOSPADM

## 2024-03-27 RX ORDER — ONDANSETRON 2 MG/ML
INJECTION INTRAMUSCULAR; INTRAVENOUS PRN
Status: DISCONTINUED | OUTPATIENT
Start: 2024-03-27 | End: 2024-03-27

## 2024-03-27 RX ORDER — ONDANSETRON 4 MG/1
4 TABLET, ORALLY DISINTEGRATING ORAL EVERY 8 HOURS PRN
Qty: 12 TABLET | Refills: 0 | Status: SHIPPED | OUTPATIENT
Start: 2024-03-27

## 2024-03-27 RX ORDER — OXYCODONE HYDROCHLORIDE 5 MG/1
5 TABLET ORAL EVERY 6 HOURS PRN
Qty: 12 TABLET | Refills: 0 | Status: SHIPPED | OUTPATIENT
Start: 2024-03-27 | End: 2024-03-30

## 2024-03-27 RX ORDER — SODIUM CHLORIDE, SODIUM LACTATE, POTASSIUM CHLORIDE, CALCIUM CHLORIDE 600; 310; 30; 20 MG/100ML; MG/100ML; MG/100ML; MG/100ML
INJECTION, SOLUTION INTRAVENOUS CONTINUOUS
Status: DISCONTINUED | OUTPATIENT
Start: 2024-03-27 | End: 2024-03-27 | Stop reason: HOSPADM

## 2024-03-27 RX ADMIN — ONDANSETRON 4 MG: 2 INJECTION INTRAMUSCULAR; INTRAVENOUS at 12:53

## 2024-03-27 RX ADMIN — OXYCODONE HYDROCHLORIDE 5 MG: 5 TABLET ORAL at 20:13

## 2024-03-27 RX ADMIN — Medication 50 MG: at 12:59

## 2024-03-27 RX ADMIN — DEXMEDETOMIDINE HYDROCHLORIDE 12 MCG: 100 INJECTION, SOLUTION INTRAVENOUS at 13:14

## 2024-03-27 RX ADMIN — DEXMEDETOMIDINE HYDROCHLORIDE 8 MCG: 100 INJECTION, SOLUTION INTRAVENOUS at 13:58

## 2024-03-27 RX ADMIN — DEXAMETHASONE SODIUM PHOSPHATE 8 MG: 4 INJECTION, SOLUTION INTRA-ARTICULAR; INTRALESIONAL; INTRAMUSCULAR; INTRAVENOUS; SOFT TISSUE at 13:11

## 2024-03-27 RX ADMIN — SODIUM CHLORIDE, POTASSIUM CHLORIDE, SODIUM LACTATE AND CALCIUM CHLORIDE: 600; 310; 30; 20 INJECTION, SOLUTION INTRAVENOUS at 12:53

## 2024-03-27 RX ADMIN — DEXMEDETOMIDINE HYDROCHLORIDE 8 MCG: 100 INJECTION, SOLUTION INTRAVENOUS at 14:59

## 2024-03-27 RX ADMIN — HYDROMORPHONE HYDROCHLORIDE 0.5 MG: 1 INJECTION, SOLUTION INTRAMUSCULAR; INTRAVENOUS; SUBCUTANEOUS at 15:27

## 2024-03-27 RX ADMIN — FENTANYL CITRATE 50 MCG: 50 INJECTION, SOLUTION INTRAMUSCULAR; INTRAVENOUS at 18:33

## 2024-03-27 RX ADMIN — FENTANYL CITRATE 100 MCG: 50 INJECTION INTRAMUSCULAR; INTRAVENOUS at 13:07

## 2024-03-27 RX ADMIN — Medication 3 G: at 13:08

## 2024-03-27 RX ADMIN — HYDROMORPHONE HYDROCHLORIDE 0.2 MG: 1 INJECTION, SOLUTION INTRAMUSCULAR; INTRAVENOUS; SUBCUTANEOUS at 20:13

## 2024-03-27 RX ADMIN — LIDOCAINE HYDROCHLORIDE 100 MG: 20 INJECTION, SOLUTION INFILTRATION; PERINEURAL at 12:59

## 2024-03-27 RX ADMIN — Medication: at 16:54

## 2024-03-27 RX ADMIN — Medication 10 MG: at 15:14

## 2024-03-27 RX ADMIN — Medication 30 MG: at 13:11

## 2024-03-27 RX ADMIN — HYDROMORPHONE HYDROCHLORIDE 0.5 MG: 1 INJECTION, SOLUTION INTRAMUSCULAR; INTRAVENOUS; SUBCUTANEOUS at 14:23

## 2024-03-27 RX ADMIN — SCOPALAMINE 1 PATCH: 1 PATCH, EXTENDED RELEASE TRANSDERMAL at 11:32

## 2024-03-27 RX ADMIN — DEXMEDETOMIDINE HYDROCHLORIDE 8 MCG: 100 INJECTION, SOLUTION INTRAVENOUS at 15:59

## 2024-03-27 RX ADMIN — ONDANSETRON 4 MG: 2 INJECTION INTRAMUSCULAR; INTRAVENOUS at 16:41

## 2024-03-27 RX ADMIN — Medication 10 MG: at 14:10

## 2024-03-27 RX ADMIN — PROPOFOL 25 MCG/KG/MIN: 10 INJECTION, EMULSION INTRAVENOUS at 13:13

## 2024-03-27 RX ADMIN — MIDAZOLAM 2 MG: 1 INJECTION INTRAMUSCULAR; INTRAVENOUS at 12:53

## 2024-03-27 RX ADMIN — Medication 30 MG: at 13:23

## 2024-03-27 RX ADMIN — ACETAMINOPHEN 975 MG: 325 TABLET, FILM COATED ORAL at 18:34

## 2024-03-27 RX ADMIN — SODIUM CHLORIDE, POTASSIUM CHLORIDE, SODIUM LACTATE AND CALCIUM CHLORIDE: 600; 310; 30; 20 INJECTION, SOLUTION INTRAVENOUS at 14:34

## 2024-03-27 RX ADMIN — SUGAMMADEX 200 MG: 100 INJECTION, SOLUTION INTRAVENOUS at 16:51

## 2024-03-27 RX ADMIN — HYDROMORPHONE HYDROCHLORIDE 0.5 MG: 1 INJECTION, SOLUTION INTRAMUSCULAR; INTRAVENOUS; SUBCUTANEOUS at 13:55

## 2024-03-27 RX ADMIN — PROPOFOL 250 MG: 10 INJECTION, EMULSION INTRAVENOUS at 12:59

## 2024-03-27 ASSESSMENT — ACTIVITIES OF DAILY LIVING (ADL)
ADLS_ACUITY_SCORE: 31
ADLS_ACUITY_SCORE: 31
ADLS_ACUITY_SCORE: 32
ADLS_ACUITY_SCORE: 31
ADLS_ACUITY_SCORE: 31
ADLS_ACUITY_SCORE: 29
ADLS_ACUITY_SCORE: 31

## 2024-03-27 NOTE — DISCHARGE INSTRUCTIONS
Same-Day Surgery   Adult Discharge Orders & Instructions     For 24 hours after surgery:  Get plenty of rest.  A responsible adult must stay with you for at least 24 hours after you leave the hospital.   Pain medication can slow your reflexes. Do not drive or use heavy equipment.  If you have weakness or tingling, don't drive or use heavy equipment until this feeling goes away.  Mixing alcohol and pain medication can cause dizziness and slow your breathing. It can even be fatal. Do not drink alcohol while taking pain medication.  Avoid strenuous or risky activities.  Ask for help when climbing stairs.   You may feel lightheaded.  If so, sit for a few minutes before standing.  Have someone help you get up.   If you have nausea (feel sick to your stomach), drink only clear liquids such as apple juice, ginger ale, broth or 7-Up.  Rest may also help.  Be sure to drink enough fluids.  Move to a regular diet as you feel able. Take pain medications with a small amount of solid food, such as toast or crackers, to avoid nausea.   A slight fever is normal. Call the doctor if your fever is over 100 F (37.7 C) (taken under the tongue) or lasts longer than 24 hours.  You may have a dry mouth, muscle aches, trouble sleeping or a sore throat.  These symptoms should go away after 24 hours.  Do not make important or legal decisions.   Pain Management:      1. Take pain medication (if prescribed) for pain as directed by your physician.        2. WARNING: If the pain medication you have been prescribed contains Tylenol  (acetaminophen), DO NOT take additional doses of Tylenol (acetaminophen).     Call your doctor for any of the followin.  Signs of infection (fever, growing tenderness at the surgery site, severe pain, a large amount of drainage or bleeding, foul-smelling drainage, redness, swelling).    2.  It has been over 8 to 10 hours since surgery and you are still not able to urinate (pee).    3.  Headache for over 24  hours.    4.  Numbness, tingling or weakness the day after surgery (if you had spinal anesthesia).  To contact a doctor, call _____________________________________ or:  '   [phone] and ask for the Resident On Call for:          __________________________________________ (answered 24 hours a day)  '   Emergency Department:  [facility]: [phone]  [facility]: [phone]  ON-Q  C-bloc Continuous Nerve Block Discharge Instructions  The Nerve Block: at you surgical incision sites    Your anesthesiologist performed a nerve block (a procedure that blocks pain to only a specific area) by inserting a small tube in your body.  This tube is connected to a pump that will help control your pain.  The pump is shaped like a balloon and is filled with medicine that causes numbness or loss of sensation to help control your pain around the incision or site of injury.  The pain pump DOES NOT contain controlled substances.    The medicine in the pump may alter your ability to feel changes in temperature or pressure. Your doctor will tell you if any special precautions apply to you.       The Pump    DO NOT SQUEEZE THE PUMP.   The pump delivers medicine at a very slow rate.  You will NOT see the medicine moving through the tubing.  As the medicine is delivered, the pump ball will slowly become smaller.  It may take a day or so before you notice a change in the size and look of the pump.  Depending on the size of your pump, it may take 2 - 5 days to give all the medicine.  The middle part of the pump may look like an apple core when empty.  Managing Your Pain  The Medicine and Infusion Rate:   0.2% Ropivacaine 4mL / hr          The continuous nerve block infusion may not block all of the pain from your surgery so it is important that you take the pain medicines prescribed by your surgeon if you need them.    If you continue to have difficulty with your pain control, please page or call  the anesthesiologist.  Caring for Your Pump at Home  Wear the pump on the outside of clothing - away from your skin and cold therapy (ice packs).  The delivery rate is accurate only at room temperature.  Make sure the white clamp on the tubing remains open (moves freely on the tubing).  Make sure there are no kinks in the tubing.  Do not tape or cover up the filter.  Protect the pump from sunlight and heat.  When sleeping:   Do not place the pump underneath the bed covers where the pump may become too warm.  Do not place the pump on the floor or hang the pump on a bed post as these situations may cause the tubing to get tangled and get pulled out.  Bathing/Showering:    We recommend taking sponge baths until the pump is removed.  It is important to not get the area where the tube enters your body wet.  It is normal for a small amount of fluid to leak from the hole in your body where the tube is inserted.  If this occurs, reinforce the bandage with another bandage.  The Infusion  Do not turn the infusion off unless your anesthesiologist has told you to do so.  Do not change the flow rate of the infusion unless your anesthesiologist told you to do so.  Changing the flow rate without your doctor s instruction may result in the wrong dose of medicine, which could cause serious injury.  If your anesthesiologist told you to change the rate of your infusion:  Flip open the clear cover.  Turn the white key on the select-a-flow dial to the instructed rate.  (The rate of the infusion should line up with the black arrow at the top of the controller.)    Listen for the click when you move the dial.  Removing the Tubing  When the pump is empty or if you have been told to stop the infusion you can remove the tubing.  Follow these steps:  Wash your hands.  Clamp the tubing (squeeze the white clamp until you hear or feel a click).  Remove the clear dressing that covers the tubing.  Grasp the tubing close to the skin and gently  pull.  If you meet resistance, stop pulling and page or call your anesthesiologist.  DO NOT cut or forcefully remove the tubing.  After removal, check the end of the tubing for a dark tip.  If you do not see a dark tip, page or call your anesthesiologist.  Apply firm pressure over the site until oozing stops.  Wash the area with soap and water, dry with a clean towel and then cover with a bandage.    The pump is not reusable. Dispose of it in the trash and wash your hands.   Troubleshooting  Tubing Comes Out From Skin:  If the tube accidentally comes out, check the end of the tube for a dark tip.  If you see a dark tip simply discard it and use the pain pills prescribed to you by your surgeon.  If you don t see a dark tip, page or call the anesthesiologist.  Tubing Disconnection:  If the tubing accidentally becomes disconnected from the pump, DO NOT reconnect the pump to the tubing.  It may have been contaminated with germs.  Close the tubing clamp and immediately page or call your anesthesiologist.  Fluid Leaking:  If enough fluid is leaking from the pump or the tubing outside your body to soak through the dressing, close the white clamp on the tubing and page or call your anesthesiologist.    Immediately report the following to your anesthesiologist:  Redness, warmth, swelling, or tenderness at the site the tubing was inserted  Increase in pain  Fever, chills, sweats  Bowel or bladder changes  Difficulty breathing  Dizziness, lightheadedness  Blurred vision  Ringing or buzzing in your ears  Metal taste in your mouth  Numbness and/or tingling around your mouth, fingers or toes  Drowsiness  Confusion  Trouble removing the tubing  Dark tip is not present when tubing is removed         Notifying your Anesthesiologist  Page:  Dial [phone], then enter [phone].  You will be prompted to enter your phone number and then the # sign.  The anesthesiologist will call you back.  Call:  Dial [phone].  Ask to speak to the  anesthesiologist on call for the [facility].                 Rev. 10/2014      ON-Q  Continuous Pain Pump Discharge Instructions after Bilateral Mastectomy with Dr. Henry    The OnQ pump:     Dr. Henry inserted a pain pump under your incisions.  The pump is shaped like a balloon and is filled with medicine that causes numbness or loss of sensation to help control your pain around the incision.  The pain pump DOES NOT contain controlled substances.    The medicine in the pump may alter your ability to feel changes in temperature or pressure.       The Pump:    The pump delivers medicine at a very slow rate.  You will NOT see the medicine moving through the tubing.  As the medicine is delivered, the pump ball will slowly become smaller.  It may take a day or so before you notice a change in the size and look of the pump.  It typically takes 5 days for all the medicine in the pump to deliver.  The middle part of the pump may look like an apple core when empty.    Managing Your Pain:    The Medicine and Infusion Rate: 0.2% Ropivacaine at 4mL/ hr     The pain pump may not block all of the pain from your surgery so it is important that you take the pain medicines prescribed by your surgeon if you need them.    If you continue to have difficulty with your pain control, please contact Dr. Henry's clinic.    Caring for Your Pump at Home:    The pump functions when the blue sensor tubing in contact with your skin. Your skin temperature keeps the valve open. Currently it is adhered with a clear dressing. Please reinforce this dressing as needed to ensure pump function.  Make sure there are no kinks in the tubing.  Do not tape or cover up the filter.  Protect the pump from sunlight and heat.  When sleeping:   Do not place the pump underneath the bed covers where the pump may become too warm.  Do not place the pump on the floor or hang the pump on a bed post as these situations may cause the tubing to  get tangled and get pulled out.  Bathing/Showering:    We recommend taking sponge baths until the pump is removed.  It is important to not get the area where the tube enters your body wet.    Removing the Tubing:    Dr. Henry will take the pain pump tubes out at your follow up appointment. If you wish to remove the tubes yourself, follow these steps:    Wash your hands.  Remove the clear dressing that covers the tubing.  Grasp the tubing close to the skin and gently pull.  If you meet resistance, stop pulling and call Dr. Henry's clinic  DO NOT cut or forcefully remove the tubing.  After removal, check the end of the tubing for a dark tip.  If you do not see a dark tip, call Dr. Henry's clinic.  Apply firm pressure over the site until oozing stops.  Wash the area with soap and water, dry with a clean towel and then cover with a bandage.    The pump is not reusable. Dispose of it in the trash and wash your hands.     Troubleshooting:    Tubing Comes Out From Skin:  If the tube accidentally comes out, check the end of the tube for a dark tip.  If you see a dark tip simply discard it and do not attempt to reinsert the tube. You will also have to remove the tube on the other side.  If you don t see a dark tip, immediately call Dr. Henry's clinic.    Tubing Disconnection:  If the tubing accidentally becomes disconnected from the pump, DO NOT reconnect the pump to the tubing.  It may have been contaminated with germs.  Remove the tubes as described above and call Dr. Henry's clinic.    Fluid Leaking:  If enough fluid is leaking from the pump or the tubing outside your body to soak through the dressing, please contact Dr. Henry's clinic.    Immediately report the following to Dr. Henry's clinic:    Redness, warmth, swelling, or tenderness at the site the tubing was inserted  Increase in pain  Fever, chills, sweats  Bowel or bladder changes  Difficulty breathing  Dizziness, lightheadedness  Blurred  vision  Ringing or buzzing in your ears  Metal taste in your mouth  Numbness and/or tingling around your mouth, fingers or toes  Drowsiness  Confusion  Trouble removing the tubing  Dark tip is not present when tubing is removed       During daytime hours call Dr. Henry's plastic surgery clinic RN at [phone] or main office at [phone]     After hours and weekends: [phone] and ask for the Resident On Call for Plastic Surgeon       Caring for your Yamil-Tavares Drains after Bilateral Mastectomy with Dr. Carl Henry placed Yamil-Tavares drainage tubes into your incisions. This tube drains fluid from your incision, helping prevent swelling and reducing the risk for infection. The tube is held in place by a few stitches. The drain will be removed at a follow up appointment when the amount of drainage decreases.     Home Care:  Make sure the tube does not pull. You may tape the tube to the skin below the bandage.  Secure the tube and bulb inside your clothing with a safety pin. This helps keep the tube from being pulled out.   Keep the bulb compressed at all times, except when you empty it.  Empty your drain at least twice a day. Empty it more often if the drain is full.   Wash your hands  Lift the opening of the drain.  Drain the fluid into a measuring cup.  Record the amount of fluid each time you empty in the chart below. Share the information with your doctor at your follow-up visit.   Squeeze the bulb with your hands until you hear air coming out of the bulb.  Close the opening.      Stripping  the tube helps keep blood clots from blocking the tube. Do this twice a day when you empty the drain:    Hold the tubing where it leaves the skin with one hand. This keeps it from pulling on the skin.  Pinch the tubing with the thumb and first finger of your other hand.   Slowly and firmly pull your thumb and first finger down the tube (squeezing the tube between your fingers). Keep squeezing the tube  as you run your fingers towards the bulb. If the pulling hurts or feels like it is coming out of the skin, STOP. Begin again more gently.  Let go of the tubing with both hands. If the tube is still blocked, repeat these steps three or four times. Make sure that the bulb is compressed so it creates suction.    When to call your doctor:  New or increased pain around the tube  Redness, warmth, or swelling around the incision or tube  Drainage that is foul smelling  Fever over 101 F degrees  Fluid leaking around the tube  Bulb won't hold suction  The tube falls out  A sudden amount of bright red drainage filling the bulb rapidly  A sudden decrease of fluid in bulb AND swelling with discomfort building up at site    Your drainage record:    Date Time Bulb 1: Amount of drainage (ml or cc) Bulb 2: Amount of drainage (ml or cc) Notes                                                                                            Rev. 4/2014

## 2024-03-27 NOTE — BRIEF OP NOTE
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 OnQ, Bilateral - Breast    Surgeon: Surgeon(s) and Role:     * Aparna Henry MD - Primary     * Tiffani Lino PA-C - Assisting     * Kathie Morris PA-C - Assisting     * Michelle Crow MD - Assisting  Anesthesia: General   Estimated Blood Loss: 50cc    Drains: Yamil-Tavares and On-Q pump  Specimens:   ID Type Source Tests Collected by Time Destination   1 : Breast, Left Tissue Breast, Left SURGICAL PATHOLOGY EXAM Aparna Henry MD 3/27/2024  2:12 PM    2 : Breast, Right Tissue Breast, Right SURGICAL PATHOLOGY EXAM Aparna Henry MD 3/27/2024  2:13 PM      Findings:   None.  Complications: None.  Implants: * No implants in log *    Bilateral mastectomy without nipple grafts

## 2024-03-27 NOTE — ANESTHESIA PROCEDURE NOTES
Airway       Patient location during procedure: OR       Procedure Start/Stop Times: 3/27/2024 1:02 PM  Staff -        CRNA: Leann Gastelum APRN CRNA       Performed By: CRNA  Consent for Airway        Urgency: elective  Indications and Patient Condition       Indications for airway management: mady-procedural       Induction type:intravenous       Mask difficulty assessment: 1 - vent by mask    Final Airway Details       Final airway type: endotracheal airway       Successful airway: ETT - single  Endotracheal Airway Details        ETT size (mm): 7.5       Cuffed: yes       Successful intubation technique: video laryngoscopy       VL Blade Size: Glidescope 3       Grade View of Cords: 1       Adjucts: stylet       Position: Right       Measured from: lips       Secured at (cm): 20       Bite block used: None    Post intubation assessment        Placement verified by: capnometry, equal breath sounds and chest rise        Number of attempts at approach: 1       Number of other approaches attempted: 0       Secured with: tape       Ease of procedure: easy       Dentition: Intact and Unchanged    Medication(s) Administered   Medication Administration Time: 3/27/2024 1:02 PM

## 2024-03-27 NOTE — ANESTHESIA CARE TRANSFER NOTE
Patient: Xavier Dodd    Procedure: Procedure(s):  MASTECTOMY, BILATERAL, SIMPLE, NO nipple grafts. OnQ       Diagnosis: Gender dysphoria in adult [F64.0]  Diagnosis Additional Information: No value filed.    Anesthesia Type:   General     Note:    Oropharynx: spontaneously breathing  Level of Consciousness: drowsy  Oxygen Supplementation: face mask    Independent Airway: airway patency satisfactory and stable  Dentition: dentition unchanged  Vital Signs Stable: post-procedure vital signs reviewed and stable  Report to RN Given: handoff report given  Patient transferred to: PACU    Handoff Report: Identifed the Patient, Identified the Reponsible Provider, Reviewed the pertinent medical history, Discussed the surgical course, Reviewed Intra-OP anesthesia mangement and issues during anesthesia, Set expectations for post-procedure period and Allowed opportunity for questions and acknowledgement of understanding      Vitals:  Vitals Value Taken Time   BP     Temp     Pulse 93 03/27/24 1721   Resp 12 03/27/24 1721   SpO2 98 % 03/27/24 1721   Vitals shown include unfiled device data.    Electronically Signed By: EMILY Tan CRNA  March 27, 2024  5:23 PM

## 2024-03-27 NOTE — ANESTHESIA PREPROCEDURE EVALUATION
"Anesthesia Pre-Procedure Evaluation    Patient: Xavier Dodd   MRN: [id number] : 2000        Procedure : Procedure(s):  MASTECTOMY, BILATERAL, SIMPLE, NO nipple grafts. OnQ          History reviewed. No pertinent past medical history.   History reviewed. No pertinent surgical history.   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  Received systemic tPA, heparin gtt and transitioned to DOAC.  Continued anticoagulation x 1 yr.  Noted to have protein S deficiency.    (+)     BEA risk factors,                                   Neurologic:     (+)      migraines,                          Cardiovascular:     (+)  - -   -  - -                                 Previous cardiac testing   Echo: Date: 2021 Results:  Normal.  EF- 59%  Stress Test:  Date: Results:    ECG Reviewed:  Date: Results:    Cath:  Date: Results:      METS/Exercise Tolerance:     Hematologic:       Musculoskeletal:       GI/Hepatic:       Renal/Genitourinary:       Endo:       Psychiatric/Substance Use:       Infectious Disease:       Malignancy:       Other:            Physical Exam    Airway        Mallampati: II   TM distance: > 3 FB   Neck ROM: full   Mouth opening: > 3 cm    Respiratory Devices and Support         Dental       (+) Minor Abnormalities - some fillings, tiny chips      Cardiovascular   cardiovascular exam normal          Pulmonary   pulmonary exam normal            OUTSIDE LABS:  CBC: No results found for: \"WBC\", \"HGB\", \"HCT\", \"PLT\"  BMP: No results found for: \"NA\", \"POTASSIUM\", \"CHLORIDE\", \"CO2\", \"BUN\", \"CR\", \"GLC\"  COAGS: No results found for: \"PTT\", \"INR\", \"FIBR\"  POC: No results " "found for: \"BGM\", \"HCG\", \"HCGS\"  HEPATIC: No results found for: \"ALBUMIN\", \"PROTTOTAL\", \"ALT\", \"AST\", \"GGT\", \"ALKPHOS\", \"BILITOTAL\", \"BILIDIRECT\", \"ALEJANDRO\"  OTHER: No results found for: \"PH\", \"LACT\", \"A1C\", \"NANCY\", \"PHOS\", \"MAG\", \"LIPASE\", \"AMYLASE\", \"TSH\", \"T4\", \"T3\", \"CRP\", \"SED\"    Anesthesia Plan    ASA Status:  3    NPO Status:  NPO Appropriate    Anesthesia Type: General.     - Airway: ETT   Induction: Propofol.   Maintenance: Balanced.   Techniques and Equipment:     - Airway: Video-Laryngoscope       Consents    Anesthesia Plan(s) and associated risks, benefits, and realistic alternatives discussed. Questions answered and patient/representative(s) expressed understanding.     - Discussed: Risks, Benefits and Alternatives for BOTH SEDATION and the PROCEDURE were discussed     - Discussed with:  Patient      - Extended Intubation/Ventilatory Support Discussed: No.      - Patient is DNR/DNI Status: No     Use of blood products discussed: No .     Postoperative Care    Pain management: IV analgesics, Oral pain medications.   PONV prophylaxis: Ondansetron (or other 5HT-3), Dexamethasone or Solumedrol, Scopolamine patch     Comments:               Andie Ellison MD    I have reviewed the pertinent notes and labs in the chart from the past 30 days and (re)examined the patient.  Any updates or changes from those notes are reflected in this note.              # Severe Obesity: Estimated body mass index is 43.38 kg/m  as calculated from the following:    Height as of 3/12/24: 1.702 m (5' 7\").    Weight as of 3/12/24: 125.6 kg (277 lb).      "

## 2024-03-27 NOTE — ANESTHESIA POSTPROCEDURE EVALUATION
Patient: Xavier Dodd    Procedure: Procedure(s):  MASTECTOMY, BILATERAL, SIMPLE, NO nipple grafts. OnQ       Anesthesia Type:  General    Note:  Disposition: Inpatient   Postop Pain Control: Uneventful            Sign Out: Well controlled pain   PONV: No   Neuro/Psych: Uneventful            Sign Out: Acceptable/Baseline neuro status   Airway/Respiratory: Uneventful            Sign Out: Acceptable/Baseline resp. status   CV/Hemodynamics:    Other NRE: NONE   DID A NON-ROUTINE EVENT OCCUR? No       Last vitals:  Vitals Value Taken Time   /67 03/27/24 1830   Temp     Pulse 110 03/27/24 1831   Resp 9 03/27/24 1831   SpO2 98 % 03/27/24 1831   Vitals shown include unfiled device data.    Electronically Signed By: Andie Ellison MD  March 27, 2024  6:33 PM

## 2024-03-28 NOTE — OP NOTE
OPERATIVE NOTE    DATE OF PROCEDURE: 3/27/24  ATTENDING: Carl  RESIDENT SURGEON: Ashlyn Crow, PGY1 (intern assisting on Dr Henry's side)  ASSISTANT: Kathie Morris PA-C (did 50% of case on contralateral side while teaching ruy Lino)  PREOP DIAGNOSIS: Gender dysphoria  POSTOP DIAGNOSIS: Same  PROCEDURE: Bilateral simple mastectomy with NO nipple graft reconstruction.  On-Q catheter placement.  ANESTHESIA: GETA  EBL: 50 cc  IV FLUIDS: 1500 cc  URINE OUTPUT: 800 cc  COUNTS: Correct  COMPLICATIONS: None  DRAINS: MAHSA x2  SPECIMENS: Right breast 1114 g, left breast 1149 g.     INDICATIONS: This is a 23 year old biologic female with a diagnosis of gender dysphoria who met WPATH and insurance criteria for gender affirming top surgery.  Due to high BMI and breast hypertrophy with large lateral chest rolls, they would require a double incision approach to their mastectomy.  They did NOT desire nipple graft reconstruction.     PROCEDURE: The patient was seen in the preoperative waiting area.  The operative sites were marked.  This included the sternal notch, sternal midline, inframammary folds with possible extension laterally, vertical line through the nipple areolar complex, vertical lines at the medial and lateral borders of the breast footplate, transverse line at the mid humerus level, and transverse line at the inferior origin of the pectoralis major muscle on flexion.     Informed consent was obtained after reviewing the possible risks and complications including but not limited to the following: Infection, bleeding, hematoma/seroma formation, poor healing, spitting sutures, dehiscence, hypertrophic scarring, partial or complete loss of nipple grafts, fat necrosis, injury to surrounding musculoskeletal or neurovascular structures- including intrathoracic or intra-axillary structures, residual asymmetries or deformities, altered sensation of the chest wall-either hypo or hyper-sensitivity, need for  further surgery, and anesthetic risks such as DVT, PE, cardiopulmonary arrest.     The patient was brought to the operating room on a stretcher and transferred to the OR table in a supine position.  After general anesthesia was administered and the patient was intubated with ETT, a Worthy was  placed.  The patient already had sequential compression devices on their lower extremities prior to induction.  Thighs and forelegs were supported on pillows and secured with padded safety straps.  A lower Alva hugger was placed.  Arms were secured to arm boards at 90 degrees from the table using 6 inch Ace wraps and padding.  The patient was then put into a sitting position on the OR table and adjustments were made as necessary to gain symmetry of the chest breast area.  The chest breast was then prepped with the usual ChloraPrep and draped in the usual sterile fashion.  A timeout was taken and the proper patient and procedure were identified.     We made our inframammary fold incisions with Peak PlasmaBlade cautery and scalpel on the left and traditional [facility] cautery with scalpel on the right.  We left approximately 2 to 3 cm of subcutaneous fat along the IMF incision before beveling down to the pectoral fascia.  The breast mound was elevated off the pec fascia superiorly towards the clavicle, medially towards the parasternal border, and laterally past the lateral border of the pectoralis major muscle.  This allowed us to displace the breast mound inferiorly and mo where it overlapped with the IMF incision.  This was just slightly lower than our preop markings, allowing for any necessary adjustments to shape and level of incision.       Since this patient was NOT interested in nipple grafting, the nipple areolar complexes were NOT harvested.     We then amputated the breast mound.  Additional thinning of the superior skin flap was done based on their body habitus.  In an effort to gain better symmetry with regard to  contour, flap thickness was adjusted to camouflage any significant asymmetries such as prominent ribs or musculature. This patient had a very narrow tapered chest wall compared to high BMI skin envelope.   Once we were happy with our original resection, our incisions were temporarily skin stapled and the patient put into a sitting position.  This allowed us to make further adjustments with regard to the level and shape of the IMF incision.  These additional areas were then resected and added to the path specimens which were passed off the back table and weighed before sending to pathology for permanent histologic exam.       Once we were happy with symmetry of contour and incisions,  including elimination of any dogears medially or laterally, we then irrigated the dissection pocket with antibiotic saline solution.  #15 round channel drains were introduced through a separate stab wound incision laterally and secured with 3-0 nylon suture.  This was draped along the inferior pocket.  Dual 10 inch On-Q catheters were percutaneously introduced from the epigastric region and draped along the superior area of the dissection pocket.  These were secured with benzoin and Tegaderm.       Definitive closure was then achieved with 2-0 Vicryl deep sutures between the breast capsule and the upper abdominal fascia.  Additional 2-0 and 3-0 Vicryl buried sutures were used for deep dermal closure.  4-0 Vicryl was used for running subcuticular suture.  5-0 fast-absorbing gut was used to touch up any areas of the skin closure.        Once we were happy with the appearance of the chest, we then applied Dermabond Prineo along the incisions.  MAHSA drain tubing was trimmed and put to bulb suction.    ABD pads were used for drain sponges.  A couple 6 inch Kerlix rolls were unfurled across the anterior chest for padding.  The thorax was then circumferentially wrapped with a double long 6 inch Ace wrap for compression.  The Worthy was   discontinued and the patient was extubated.  They were transferred to a stretcher.  The On-Q catheters were attached to the reservoir containing 550 cc of 0.2% ropivacaine to be delivered at 2 cc/h per catheter for the next 5 days.  The patient was then taken to the recovery room in satisfactory condition having tolerated the procedure without difficulty and complication.  Final weights as measured in the OR were right breast equals 1114  g and left breast equals 1149  g.

## 2024-03-29 ENCOUNTER — TELEPHONE (OUTPATIENT)
Dept: PLASTIC SURGERY | Facility: CLINIC | Age: 24
End: 2024-03-29
Payer: COMMERCIAL

## 2024-03-29 NOTE — TELEPHONE ENCOUNTER
[facility] Call Center    Phone Message    May a detailed message be left on voicemail: yes     Reason for Call: Other: Pt's partner is calling in asking for a call back, as they had questions for the Pt's care team following their surgery. Please call back as soon as possible to discuss.     Action Taken: Message routed to:  [facility] ([facility] Plastic Surg    Travel Screening: Not Applicable

## 2024-03-29 NOTE — TELEPHONE ENCOUNTER
Patient's partner called with questions about OnQ pump on POD-2. RN returned call and got Taveras's verbal over phone to talk to M as RN didn't see a CAM for M in chart.     Patient's reported pain rating: 3/10. Pain is located mostly under armpits, center of chest.  Patient is currently taking oxycodone for pain with the following frequency: oxycodone in morning and once at night. Taking ibuprofen and tylenol on schedule.     Any concerns regarding drain: denies, left output > right  Reported output of Left drain this mornin mL  Reported output of Right drain this mornin mL    Any concerns regarding  Nausea: denies  Constipation: denies  Incisions: not unwrapped,   Nipple grafts: n/a    Other concerns: On-Q not shrinking in size. RN described this is not noticeable until after day 2.    RN advised pt to call if there are any concerns or questions. RN asked patient if they want to come in on POD-6 instead of POD-9 but patient has class on Tuesday and cannot make that work.  Sandie Wray RN on 3/29/2024 at 12:13 PM

## 2024-03-30 ENCOUNTER — TELEPHONE (OUTPATIENT)
Dept: SURGERY | Facility: CLINIC | Age: 24
End: 2024-03-30
Payer: COMMERCIAL

## 2024-03-31 NOTE — TELEPHONE ENCOUNTER
Plastic Surgery Progress Note  Telephone Encounter    03/30/2024    Xavier Dodd is a 23 year old adult with h/o gender dysphoria now POD# 3 s/p bilateral gender affirming mastectomy.    Patient reports that there is dried blood around the onQ pump site and wanted to know if this was normal. Patient was reassured that this is normal and to monitor for any increased swelling or erythema. They currently denied any swelling or erythema and denied any other concerns at this time.    They were encouraged to call with any further questions or concerns.    Michelle Crow MD  Plastic and Reconstructive Surgery PGY1

## 2024-04-01 LAB
PATH REPORT.COMMENTS IMP SPEC: NORMAL
PATH REPORT.COMMENTS IMP SPEC: NORMAL
PATH REPORT.FINAL DX SPEC: NORMAL
PATH REPORT.GROSS SPEC: NORMAL
PATH REPORT.MICROSCOPIC SPEC OTHER STN: NORMAL
PATH REPORT.RELEVANT HX SPEC: NORMAL
PHOTO IMAGE: NORMAL

## 2024-04-02 ENCOUNTER — NURSE TRIAGE (OUTPATIENT)
Dept: NURSING | Facility: CLINIC | Age: 24
End: 2024-04-02
Payer: COMMERCIAL

## 2024-04-03 NOTE — TELEPHONE ENCOUNTER
Pt calling with concerns about;    Post surgical tubes   Drainage has stopped  Was able to pull one tube out easily  The second tube is resistant  Pt wanting to know if they should leave the resistant tube in at this time  Finished abx on 4/1/24    Denies;  Increased pain  Signs/sx of infection  Rash  fever    According to the protocol, page sent to [facility] Provider, Dr. Michelle Crow who advised;  Tube is not tethered to anything inside but maybe tangled somewhere.  If meeting with a lot of resistance -like tugging and still resistant, Pt should leave tube in and call surgeons office in the morning.    Care advice given. Patient verbalizes understanding and agrees with plan of care.     Echo Farnsworth RN, Nurse Advisor 7:55 PM 4/2/2024  Reason for Disposition   [1] Caller has URGENT question AND [2] triager unable to answer question    Additional Information   Negative: Sounds like a life-threatening emergency to the triager   Negative: Chest pain   Negative: Difficulty breathing   Negative: Acting confused (e.g., disoriented, slurred speech) or excessively sleepy   Negative: Post-Op tonsil and adenoid surgery, symptoms or questions about   Negative: Surgical incision symptoms and questions   Negative: [1] Pain or burning with passing urine (urination) AND [2] male   Negative: [1] Pain or burning with passing urine (urination) AND [2] female   Negative: Constipation   Negative: New or worsening leg (calf, thigh) pain   Negative: New or worsening leg swelling   Negative: Dizziness is severe, or persists > 24 hours after surgery   Negative: Pain, redness, swelling, or pus at IV Site   Negative: Symptoms arising from use of a urinary catheter (e.g., Coude, Worthy)   Negative: Cast problems or questions   Negative: Medication question   Negative: [1] Widespread rash AND [2] bright red, sunburn-like   Negative: [1] SEVERE headache AND [2] after spinal (epidural) anesthesia   Negative: [1] Vomiting AND [2] persists > 4 hours    Negative: [1] Vomiting AND [2] abdomen looks much more swollen than usual   Negative: [1] Drinking very little AND [2] dehydration suspected (e.g., no urine > 12 hours, very dry mouth, very lightheaded)   Negative: Patient sounds very sick or weak to the triager   Negative: Sounds like a serious complication to the triager   Negative: Fever > 100.4 F (38.0 C)   Negative: [1] SEVERE post-op pain (e.g., excruciating, pain scale 8-10) AND [2] not controlled with pain medications    Protocols used: Post-Op Symptoms and [id number]

## 2024-04-05 ENCOUNTER — OFFICE VISIT (OUTPATIENT)
Dept: PLASTIC SURGERY | Facility: CLINIC | Age: 24
End: 2024-04-05
Payer: COMMERCIAL

## 2024-04-05 VITALS
WEIGHT: 268 LBS | DIASTOLIC BLOOD PRESSURE: 84 MMHG | TEMPERATURE: 99 F | OXYGEN SATURATION: 99 % | HEIGHT: 67 IN | SYSTOLIC BLOOD PRESSURE: 126 MMHG | HEART RATE: 103 BPM | BODY MASS INDEX: 42.06 KG/M2

## 2024-04-05 DIAGNOSIS — Z90.13 STATUS POST MASTECTOMY, BILATERAL: Primary | ICD-10-CM

## 2024-04-05 PROCEDURE — 99024 POSTOP FOLLOW-UP VISIT: CPT | Performed by: NURSE PRACTITIONER

## 2024-04-05 ASSESSMENT — PAIN SCALES - GENERAL: PAINLEVEL: MODERATE PAIN (4)

## 2024-04-05 NOTE — PROGRESS NOTES
"Plastic Surgery Outpatient Visit    ID: \"Nitin\" Xavier Dodd is a 23 year old other s/p bilateral mastectomy without nipple grafts for gender dysphoria 3/27/2024 with Dr. Henry.     S: Nitin is doing well. Their pain is well controlled with tylenol and ibuprofen. They took 5 days of their prescribed 10 day course of Lovenox. They did talk with PCP about this to let them know. They state they are up moving around to help prevent clots. Drains meet removal criteria    O:  /84 (BP Location: Left arm, Patient Position: Chair, Cuff Size: Adult Large)   Pulse 103   Temp 99  F (37.2  C) (Oral)   Ht 1.702 m (5' 7\")   Wt 121.6 kg (268 lb)   LMP 03/24/2024 (Exact Date)   SpO2 99%   BMI 41.97 kg/m     General: NAD  Chest: Bilateral chest incisions c/d/I with incisional tape in place. No nipple grafts present. Mild areas of purple and yellow bruising above bilateral incisions. No significant swelling or ecchymosis.    PATH: Bilateral breast, simple mastectomy: Bilateral sides show benign breast tissue. Benign nipple and skin. Negative for atypia or malignancy    A/P:  -healing well  -drains removed today  -wrap x1 week  - tape off in 2 weeks.  -Trex/5lb lifting restrictions x2 more weeks. Ok to increase movement and lifting up to 10lbs after this until 6 weeks post op, when restrictions will likely be cleared.  -RTC 6 weeks with Dr. Carl Harmon, APRN, CNP  [facility]    20 minutes spent on the date of the encounter doing chart review, history and physical, dressing changes, documentation and further activity as noted above.   "

## 2024-04-05 NOTE — PATIENT INSTRUCTIONS
Care of Chest Incisions    Keep compression on for 1 more week from today. Continue modified T-Azael arms and do not life more than 5 pounds for 2 more weeks. At 3 weeks post op you may begin to use your arms as you normally would and you can lift up to 10 pounds until your 6 week post-op appointment with Dr Henry, when restrictions are typically lifted. Remove the tape from your incisions by 3 weeks post op. You may start moisturizing your incisions with any non-scented, non-glittered lotion 3 weeks from your surgery date. You can start scar care after the tape is removed. Any over the counter scar care is ok, we recommend silicone strips or sheets. These can be purchased on "[employer]" and at [employer] and [employer].     At one month post op, baseline shoulder motion should return. Full shoulder motion should return by 8 weeks.      When to call:  Sudden increase of swelling or pain on one side  Uncontrolled pain despite pain medication  Worsening of chest swelling   Separation of nipple from chest skin  Redness and warmth in chest area  Fever > 101     Contact the RN between 8-3:30 Mon-Fri with questions or concerns through my chart message via your doctor's name (most efficient) or call at [phone].   For urgent medical issues that cannot wait, call [phone] Mon-Fri 8:-4:30.     After hours, weekends or holidays, call [phone] and ask to speak to the on call plastic surgeon fellow.

## 2024-04-05 NOTE — LETTER
"4/5/2024       RE: Xavier Dodd  [address]  [place] [address]     Dear Colleague,    Thank you for referring your patient, Xavier Dodd, to the [facility] PLASTIC AND RECONSTRUCTIVE SURGERY CLINIC [place] at [facility]. Please see a copy of my visit note below.    Plastic Surgery Outpatient Visit    ID: \"Nitin\" Xavier Dodd is a 23 year old other s/p bilateral mastectomy without nipple grafts for gender dysphoria 3/27/2024 with Dr. Henry.     S: Nitin is doing well. Their pain is well controlled with tylenol and ibuprofen. They took 5 days of their prescribed 10 day course of Lovenox. They did talk with PCP about this to let them know. They state they are up moving around to help prevent clots. Drains meet removal criteria    O:  /84 (BP Location: Left arm, Patient Position: Chair, Cuff Size: Adult Large)   Pulse 103   Temp 99  F (37.2  C) (Oral)   Ht 1.702 m (5' 7\")   Wt 121.6 kg (268 lb)   LMP 03/24/2024 (Exact Date)   SpO2 99%   BMI 41.97 kg/m     General: NAD  Chest: Bilateral chest incisions c/d/I with incisional tape in place. No nipple grafts present. Mild areas of purple and yellow bruising above bilateral incisions. No significant swelling or ecchymosis.    PATH: Bilateral breast, simple mastectomy: Bilateral sides show benign breast tissue. Benign nipple and skin. Negative for atypia or malignancy    A/P:  -healing well  -drains removed today  -wrap x1 week  - tape off in 2 weeks.  -Trex/5lb lifting restrictions x2 more weeks. Ok to increase movement and lifting up to 10lbs after this until 6 weeks post op, when restrictions will likely be cleared.  -RTC 6 weeks with Dr. Henry    20 minutes spent on the date of the encounter doing chart review, history and physical, dressing changes, documentation and further activity as noted above.       Again, thank you for allowing me to participate in the care " of your patient.      Sincerely,    EMILY Whitaker CNP

## 2024-04-05 NOTE — NURSING NOTE
"Chief Complaint   Patient presents with    SCOTT Taveras, is being seen today for a 1 week post-op DOS 3/27/24       Vitals:    04/05/24 1318   BP: 126/84   BP Location: Left arm   Patient Position: Chair   Cuff Size: Adult Large   Pulse: 103   Temp: 99  F (37.2  C)   TempSrc: Oral   SpO2: 99%   Weight: 121.6 kg (268 lb)   Height: 1.702 m (5' 7\")       Body mass index is 41.97 kg/m .      Oly Ceballos LPN    "

## 2024-06-25 ENCOUNTER — OFFICE VISIT (OUTPATIENT)
Dept: PLASTIC SURGERY | Facility: CLINIC | Age: 24
End: 2024-06-25
Payer: COMMERCIAL

## 2024-06-25 VITALS
BODY MASS INDEX: 42.53 KG/M2 | SYSTOLIC BLOOD PRESSURE: 118 MMHG | HEART RATE: 109 BPM | DIASTOLIC BLOOD PRESSURE: 84 MMHG | HEIGHT: 67 IN | OXYGEN SATURATION: 97 % | WEIGHT: 271 LBS

## 2024-06-25 DIAGNOSIS — Z90.13 S/P BILATERAL MASTECTOMY: Primary | ICD-10-CM

## 2024-06-25 PROCEDURE — 99024 POSTOP FOLLOW-UP VISIT: CPT | Performed by: SURGERY

## 2024-06-25 ASSESSMENT — PAIN SCALES - GENERAL: PAINLEVEL: NO PAIN (0)

## 2024-06-25 NOTE — LETTER
"6/25/2024       RE: Xavier Dodd  [address]  [place] [address]     Dear Colleague,    Thank you for referring your patient, Xavier Dodd, to the [facility] PLASTIC AND RECONSTRUCTIVE SURGERY CLINIC [place] at [facility]. Please see a copy of my visit note below.    PLASTICS POST-OP  24 year old transmasc adult who presents 3 months post-op after a bilateral simple mastectomy without nipple graft conservation on 3/27/2024. They are here today by themself.    They missed their 6 week appointment due to their class schedule which they didn't have when they scheduled their appointment.     The initial amount of narcotics provided was sufficient to cover post-surgical pain. OnQ pump was also thought to be helpful. \"I ran out of pain pills and the pain pump at the same time so it was hard to say which was more helpful.\"  Sensation has mostly returned, with slight extra sensitivity over the scars.    PE: Chest: Nice upper chest contour.   Good symmetry.   Some new striae  Scars are wide but flat  Incisions are straight   Incisions do not touch at midline  Very tiny dog ears  Slight asymmetry.  No nipples.  Photos taken with verbal consent.     A&P: 24 year old transmasc adult who presents 3 months post-op after a bilateral simple mastectomy without nipple graft conservation on 3/27/2024.     Overall Nitin is healing well. They can gradually increase activity as tolerated. They have some very slight tightness in the axilla with 99% ROM    We discussed scar reducing techniques, such as Mederma, silicone strips, vitamin E oil, emu oil, massage and when he may begin using these. For scar care, at home Nitin has been doing aquaphor when they remember. \"I'm not interested in them disappearing\" they report.     Nitin is happy with their results. Dysphoria has improved since the time of their top surgery. \"I can wear all kinds of clothes now that " "I could never wear before,\" they state. \"I ended up with little dog ears but I don't care,\" they say. They say they were considering HRT before top surgery, but postoperatively they feel like they don't need this as much anymore. They are less bothered by being misgendered now.     They will follow up as needed. Causes for returning sooner were discussed.     Total time = 20 minutes, spent on the date of encounter doing chart review, history and physical, dressing changes, documentation, patient education, and any further activity as noted above.     This note was prepared on behalf of Aparna Henry MD by Lauren Palencia (they/them) EMT, a trained medical scribe, based on my observations and the provider's statements to me.           Again, thank you for allowing me to participate in the care of your patient.      Sincerely,    Aparna Henry MD    "

## 2024-06-25 NOTE — PROGRESS NOTES
"PLASTICS POST-OP  24 year old transmasc adult who presents 3 months post-op after a bilateral simple mastectomy without nipple graft conservation on 3/27/2024. They are here today by themself.    They missed their 6 week appointment due to their class schedule which they didn't have when they scheduled their appointment.     The initial amount of narcotics provided was sufficient to cover post-surgical pain. OnQ pump was also thought to be helpful. \"I ran out of pain pills and the pain pump at the same time so it was hard to say which was more helpful.\"  Sensation has mostly returned, with slight extra sensitivity over the scars.    PE: Chest: Nice upper chest contour.   Good symmetry.   Some new striae  Scars are wide but flat  Incisions are straight   Incisions do not touch at midline  Very tiny dog ears  Slight asymmetry.  No nipples.  Photos taken with verbal consent.     A&P: 24 year old transmasc adult who presents 3 months post-op after a bilateral simple mastectomy without nipple graft conservation on 3/27/2024.     Overall Nitin is healing well. They can gradually increase activity as tolerated. They have some very slight tightness in the axilla with 99% ROM    We discussed scar reducing techniques, such as Mederma, silicone strips, vitamin E oil, emu oil, massage and when he may begin using these. For scar care, at home Nitin has been doing aquaphor when they remember. \"I'm not interested in them disappearing\" they report.     Nitin is happy with their results. Dysphoria has improved since the time of their top surgery. \"I can wear all kinds of clothes now that I could never wear before,\" they state. \"I ended up with little dog ears but I don't care,\" they say. They say they were considering HRT before top surgery, but postoperatively they feel like they don't need this as much anymore. They are less bothered by being misgendered now.     They will follow up as needed. Causes for returning sooner were " discussed.     Total time = *** minutes, spent on the date of encounter doing chart review, history and physical, dressing changes, documentation, patient education, and any further activity as noted above.     This note was prepared on behalf of Aparna Henry MD by Lauren Palencia (they/them) EMT, a trained medical scribe, based on my observations and the provider's statements to me.

## 2024-06-25 NOTE — NURSING NOTE
"Chief Complaint   Patient presents with    SCOTT Taveras, is being seen today for a post-op DOS 3/27/24, no concerns at  this time, as reported by patient.       Vitals:    06/25/24 1701   BP: 118/84   BP Location: Left arm   Patient Position: Chair   Cuff Size: Adult Large   Pulse: 109   SpO2: 97%   Weight: 122.9 kg (271 lb)   Height: 1.702 m (5' 7\")       Body mass index is 42.44 kg/m .      Oly Ceballos LPN    "

## 2024-07-14 ENCOUNTER — HEALTH MAINTENANCE LETTER (OUTPATIENT)
Age: 24
End: 2024-07-14

## 2025-07-19 ENCOUNTER — HEALTH MAINTENANCE LETTER (OUTPATIENT)
Age: 25
End: 2025-07-19

## (undated) DEVICE — BLADE KNIFE SURG 15 371115

## (undated) DEVICE — POSITIONER HEAD DONUT FOAM 9" LF FP-HEAD9

## (undated) DEVICE — POSITIONER ARMBOARD FOAM 1PAIR LF FP-ARMB1

## (undated) DEVICE — LINEN ORTHO PACK 5446

## (undated) DEVICE — FILTER HEPA FLUID TRAP NEPTUNE 0703-040-001

## (undated) DEVICE — DRSG KERLIX 4 1/2"X4YDS ROLL 6730

## (undated) DEVICE — STPL SKIN 35W ROTATING HEAD PRW35

## (undated) DEVICE — DRSG ABDOMINAL 07 1/2X8" 7197D

## (undated) DEVICE — SU PLAIN FAST ABSORB 5-0 PC-1 18" 1915G

## (undated) DEVICE — ADH LIQUID MASTISOL TOPICAL VIAL 2-3ML 0523-48

## (undated) DEVICE — ESU PENCIL W/SMOKE EVAC NEPTUNE STRYKER 0703-046-000

## (undated) DEVICE — STRAP KNEE/BODY 31143004

## (undated) DEVICE — WIPES FOLEY CARE SURESTEP PROVON DFC100

## (undated) DEVICE — SPECIMEN CONTAINER 5OZ STERILE 2600SA

## (undated) DEVICE — SYR BULB IRRIG DOVER 60 ML LATEX FREE 67000

## (undated) DEVICE — DRAIN JACKSON PRATT RESERVOIR 100ML SU130-1305

## (undated) DEVICE — GLOVE SURG PI ULTRA TOUCH M SZ 6-1/2 LF

## (undated) DEVICE — DRSG TEGADERM 2 3/8X2 3/4" 1624W

## (undated) DEVICE — DECANTER TRANSFER DEVICE 2008S

## (undated) DEVICE — BNDG ELASTIC 6"X5YDS UNSTERILE 6611-60

## (undated) DEVICE — SOL NACL 0.9% IRRIG 1000ML BOTTLE 2F7124

## (undated) DEVICE — TUBING SUCTION MEDI-VAC 1/4"X20' N620A

## (undated) DEVICE — DRAPE LAP TRANSVERSE 29421

## (undated) DEVICE — SU VICRYL 4-0 PS-1 18" UND J682G

## (undated) DEVICE — GLOVE BIOGEL PI MICRO INDICATOR UNDERGLOVE SZ 7.0 48970

## (undated) DEVICE — GOWN XLG DISP 9545

## (undated) DEVICE — SU VICRYL 3-0 FS-1 27" J442H

## (undated) DEVICE — ESU BLADE PEAK PLASMA 3.0S PS210-030S

## (undated) DEVICE — CATH TRAY FOLEY SURESTEP 16FR W/URINE MTR STATLK LF A303416A

## (undated) DEVICE — DRAIN JACKSON PRATT CHANNEL 15FR ROUND HUBLESS SIL JP-2228

## (undated) DEVICE — SU DERMABOND PRINEO 42CM CLR422US

## (undated) DEVICE — BNDG ELASTIC 6" DBL LENGTH UNSTERILE 6611-16

## (undated) DEVICE — COVER CAMERA IN-LIGHT DISP LT-C02

## (undated) DEVICE — ESU GROUND PAD UNIVERSAL W/O CORD

## (undated) DEVICE — Device

## (undated) DEVICE — SPONGE LAP 18X18" X8435

## (undated) DEVICE — EYE MARKING PAD 581057

## (undated) DEVICE — SU VICRYL 2-0 CT-2 27" J333H

## (undated) DEVICE — PAD CHUX UNDERPAD 30X36" P3036C

## (undated) DEVICE — GLOVE BIOGEL PI MICRO SZ 6.5 48565

## (undated) DEVICE — LABEL MEDICATION SYSTEM 3303-P

## (undated) DEVICE — SUCTION MANIFOLD NEPTUNE 2 SYS 4 PORT 0702-020-000

## (undated) DEVICE — SU ETHILON 3-0 FS-1 18" 663G

## (undated) DEVICE — PREP CHLORAPREP 26ML TINTED HI-LITE ORANGE 930815

## (undated) DEVICE — LIGHT HANDLE X2

## (undated) DEVICE — SOL WATER IRRIG 1000ML BOTTLE 2F7114

## (undated) DEVICE — BLADE KNIFE SURG 10 371110

## (undated) RX ORDER — FENTANYL CITRATE 50 UG/ML
INJECTION, SOLUTION INTRAMUSCULAR; INTRAVENOUS
Status: DISPENSED
Start: 2024-03-27

## (undated) RX ORDER — HYDROMORPHONE HYDROCHLORIDE 1 MG/ML
INJECTION, SOLUTION INTRAMUSCULAR; INTRAVENOUS; SUBCUTANEOUS
Status: DISPENSED
Start: 2024-03-27

## (undated) RX ORDER — BUPIVACAINE HYDROCHLORIDE 2.5 MG/ML
INJECTION, SOLUTION EPIDURAL; INFILTRATION; INTRACAUDAL
Status: DISPENSED
Start: 2024-03-27

## (undated) RX ORDER — ONDANSETRON 2 MG/ML
INJECTION INTRAMUSCULAR; INTRAVENOUS
Status: DISPENSED
Start: 2024-03-27

## (undated) RX ORDER — OXYCODONE HYDROCHLORIDE 5 MG/1
TABLET ORAL
Status: DISPENSED
Start: 2024-03-27

## (undated) RX ORDER — PROPOFOL 10 MG/ML
INJECTION, EMULSION INTRAVENOUS
Status: DISPENSED
Start: 2024-03-27

## (undated) RX ORDER — CEFAZOLIN SODIUM 1 G/3ML
INJECTION, POWDER, FOR SOLUTION INTRAMUSCULAR; INTRAVENOUS
Status: DISPENSED
Start: 2024-03-27

## (undated) RX ORDER — SCOLOPAMINE TRANSDERMAL SYSTEM 1 MG/1
PATCH, EXTENDED RELEASE TRANSDERMAL
Status: DISPENSED
Start: 2024-03-27

## (undated) RX ORDER — ACETAMINOPHEN 325 MG/1
TABLET ORAL
Status: DISPENSED
Start: 2024-03-27